# Patient Record
Sex: MALE | Race: AMERICAN INDIAN OR ALASKA NATIVE | NOT HISPANIC OR LATINO | Employment: FULL TIME | URBAN - METROPOLITAN AREA
[De-identification: names, ages, dates, MRNs, and addresses within clinical notes are randomized per-mention and may not be internally consistent; named-entity substitution may affect disease eponyms.]

---

## 2017-01-16 ENCOUNTER — OFFICE VISIT (OUTPATIENT)
Dept: INTERNAL MEDICINE | Facility: CLINIC | Age: 47
End: 2017-01-16
Payer: COMMERCIAL

## 2017-01-16 ENCOUNTER — LAB VISIT (OUTPATIENT)
Dept: LAB | Facility: HOSPITAL | Age: 47
End: 2017-01-16
Attending: FAMILY MEDICINE
Payer: COMMERCIAL

## 2017-01-16 VITALS
DIASTOLIC BLOOD PRESSURE: 78 MMHG | BODY MASS INDEX: 37.43 KG/M2 | SYSTOLIC BLOOD PRESSURE: 142 MMHG | HEART RATE: 78 BPM | TEMPERATURE: 97 F | WEIGHT: 246.94 LBS | HEIGHT: 68 IN | OXYGEN SATURATION: 99 %

## 2017-01-16 DIAGNOSIS — E66.9 OBESITY, CLASS II, BMI 35-39.9, NO COMORBIDITY: ICD-10-CM

## 2017-01-16 DIAGNOSIS — I10 ESSENTIAL HYPERTENSION: Primary | ICD-10-CM

## 2017-01-16 DIAGNOSIS — N52.9 ERECTILE DYSFUNCTION, UNSPECIFIED ERECTILE DYSFUNCTION TYPE: ICD-10-CM

## 2017-01-16 DIAGNOSIS — K21.9 GASTROESOPHAGEAL REFLUX DISEASE, ESOPHAGITIS PRESENCE NOT SPECIFIED: ICD-10-CM

## 2017-01-16 DIAGNOSIS — D64.9 ANEMIA, UNSPECIFIED TYPE: ICD-10-CM

## 2017-01-16 LAB
BASOPHILS # BLD AUTO: 0.02 K/UL
BASOPHILS NFR BLD: 0.4 %
DIFFERENTIAL METHOD: ABNORMAL
EOSINOPHIL # BLD AUTO: 0.1 K/UL
EOSINOPHIL NFR BLD: 1.3 %
ERYTHROCYTE [DISTWIDTH] IN BLOOD BY AUTOMATED COUNT: 16.1 %
HCT VFR BLD AUTO: 41.4 %
HGB BLD-MCNC: 13.3 G/DL
LYMPHOCYTES # BLD AUTO: 2 K/UL
LYMPHOCYTES NFR BLD: 41.9 %
MCH RBC QN AUTO: 28.9 PG
MCHC RBC AUTO-ENTMCNC: 32.1 %
MCV RBC AUTO: 90 FL
MONOCYTES # BLD AUTO: 0.5 K/UL
MONOCYTES NFR BLD: 10.3 %
NEUTROPHILS # BLD AUTO: 2.1 K/UL
NEUTROPHILS NFR BLD: 46.1 %
PLATELET # BLD AUTO: 241 K/UL
PMV BLD AUTO: 11.2 FL
RBC # BLD AUTO: 4.61 M/UL
TESTOST SERPL-MCNC: 417 NG/DL
WBC # BLD AUTO: 4.65 K/UL

## 2017-01-16 PROCEDURE — 85025 COMPLETE CBC W/AUTO DIFF WBC: CPT

## 2017-01-16 PROCEDURE — 1159F MED LIST DOCD IN RCRD: CPT | Mod: S$GLB,,, | Performed by: FAMILY MEDICINE

## 2017-01-16 PROCEDURE — 3077F SYST BP >= 140 MM HG: CPT | Mod: S$GLB,,, | Performed by: FAMILY MEDICINE

## 2017-01-16 PROCEDURE — 3078F DIAST BP <80 MM HG: CPT | Mod: S$GLB,,, | Performed by: FAMILY MEDICINE

## 2017-01-16 PROCEDURE — 99214 OFFICE O/P EST MOD 30 MIN: CPT | Mod: S$GLB,,, | Performed by: FAMILY MEDICINE

## 2017-01-16 PROCEDURE — 99999 PR PBB SHADOW E&M-EST. PATIENT-LVL III: CPT | Mod: PBBFAC,,, | Performed by: FAMILY MEDICINE

## 2017-01-16 PROCEDURE — 84403 ASSAY OF TOTAL TESTOSTERONE: CPT

## 2017-01-16 PROCEDURE — 36415 COLL VENOUS BLD VENIPUNCTURE: CPT | Mod: PO

## 2017-01-16 RX ORDER — LOSARTAN POTASSIUM AND HYDROCHLOROTHIAZIDE 12.5; 1 MG/1; MG/1
TABLET ORAL
Qty: 90 TABLET | Refills: 1 | Status: SHIPPED | OUTPATIENT
Start: 2017-01-16 | End: 2017-09-14 | Stop reason: SDUPTHER

## 2017-01-16 RX ORDER — TADALAFIL 10 MG/1
10 TABLET ORAL DAILY PRN
Qty: 10 TABLET | Refills: 0 | Status: SHIPPED | OUTPATIENT
Start: 2017-01-16 | End: 2017-05-29 | Stop reason: SDUPTHER

## 2017-01-16 NOTE — MR AVS SNAPSHOT
[facility]  [address] [facility] [place] [address]  Phone: [phone]  Fax: [phone]                  Guillaume Conde   2017 9:40 AM   Office Visit    Description:  Male : 1970   Provider:  Trisha Burger MD   Department:  [facility] - Internal Medicine           Reason for Visit     Follow-up           Diagnoses this Visit        Comments    Essential hypertension    -  Primary     Gastroesophageal reflux disease, esophagitis presence not specified         Obesity, Class II, BMI 35-39.9, no comorbidity         Erectile dysfunction, unspecified erectile dysfunction type         Low sexual desire disorder                To Do List           Future Appointments        Provider Department Dept Phone    2017 11:30 AM LAB, SAME DAY [facility] [phone]      Goals (5 Years of Data)     None      Follow-Up and Disposition     Return in about 6 months (around 2017), or if symptoms worsen or fail to improve.       These Medications        Disp Refills Start End    losartan-hydrochlorothiazide 100-12.5 mg (HYZAAR) 100-12.5 mg Tab 90 tablet 1 2017     TK 1 T PO D    Pharmacy: [facility] [address] [place] [address] [address] Ph #: [phone]       tadalafil (CIALIS) 10 MG tablet 10 tablet 0 2017    Take 1 tablet (10 mg total) by mouth daily as needed for Erectile Dysfunction. - Oral    Pharmacy: [facility] [address] [place] [id number] [facility] [address] Ph #: [phone]         [facility] On Call     [facility] On Call Nurse Care Line -  Assistance  Registered nurses in the [facility] On Call Center provide clinical advisement, health education, appointment booking, and other advisory services.  Call for this free service at [phone].             Medications           Message regarding Medications     Verify the changes and/or additions to your medication regime listed  "below are the same as discussed with your clinician today.  If any of these changes or additions are incorrect, please notify your healthcare provider.             Verify that the below list of medications is an accurate representation of the medications you are currently taking.  If none reported, the list may be blank. If incorrect, please contact your healthcare provider. Carry this list with you in case of emergency.           Current Medications     losartan-hydrochlorothiazide 100-12.5 mg (HYZAAR) 100-12.5 mg Tab TK 1 T PO D    esomeprazole (NEXIUM) 20 MG capsule Take 20 mg by mouth before breakfast.    tadalafil (CIALIS) 10 MG tablet Take 1 tablet (10 mg total) by mouth daily as needed for Erectile Dysfunction.           Clinical Reference Information           Vital Signs - Last Recorded  Most recent update: 1/16/2017  9:24 AM by Mallika Zabala MA    BP Pulse Temp Ht Wt SpO2    (!) 142/78 78 96.7 °F (35.9 °C) (Tympanic) 5' 8" (1.727 m) 112 kg (246 lb 14.6 oz) 99%    BMI                37.54 kg/m2          Blood Pressure          Most Recent Value    BP  (!)  142/78      Allergies as of 1/16/2017     No Known Allergies      Immunizations Administered on Date of Encounter - 1/16/2017     None      Orders Placed During Today's Visit     Future Labs/Procedures Expected by Expires    CBC auto differential  1/16/2017 3/17/2018    TESTOSTERONE  1/16/2017 3/17/2018      "

## 2017-01-16 NOTE — PROGRESS NOTES
"Subjective:       Patient ID: Guillaume Conde is a 46 y.o. male.    Chief Complaint: Follow-up    HPI Comments: 46-year-old -American male patient with Patient Active Problem List:     GERD (gastroesophageal reflux disease)     Essential hypertension  Here for follow-up on chronic medical conditions, reports that he is been taking Nexium only as needed, takes blood pressure medication regularly.  Patient has been having low sexual desire and requesting refill on Cialis.  Patient has not been previously checked on hormone levels  Has been exercising twice a week, denies of any chest pain or shortness of breath, headache, reports minimal fatigue        Review of Systems   Constitutional: Positive for fatigue. Negative for appetite change.   Eyes: Negative for visual disturbance.   Respiratory: Negative for shortness of breath.    Cardiovascular: Negative for chest pain and leg swelling.   Gastrointestinal: Negative for abdominal pain, nausea and vomiting.   Musculoskeletal: Negative for myalgias.   Skin: Negative for rash.   Neurological: Negative for headaches.   Psychiatric/Behavioral: Negative for sleep disturbance.         Visit Vitals    BP (!) 142/78    Pulse 78    Temp 96.7 °F (35.9 °C) (Tympanic)    Ht 5' 8" (1.727 m)    Wt 112 kg (246 lb 14.6 oz)    SpO2 99%    BMI 37.54 kg/m2     Objective:      Physical Exam   Constitutional: He is oriented to person, place, and time. He appears well-developed and well-nourished.   HENT:   Head: Normocephalic and atraumatic.   Mouth/Throat: Oropharynx is clear and moist.   Cardiovascular: Normal rate, regular rhythm and normal heart sounds.    No murmur heard.  Pulmonary/Chest: Effort normal and breath sounds normal. He has no wheezes.   Abdominal: Soft. Bowel sounds are normal. There is no tenderness.   Musculoskeletal: He exhibits no edema.   Neurological: He is alert and oriented to person, place, and time.   Skin: Skin is warm and dry. No rash noted. "   Psychiatric: He has a normal mood and affect.         Assessment:       1. Essential hypertension    2. Gastroesophageal reflux disease, esophagitis presence not specified    3. Obesity, Class II, BMI 35-39.9, no comorbidity    4. Erectile dysfunction, unspecified erectile dysfunction type    5. Low sexual desire disorder    6. Anemia, unspecified type        Plan:   Essential hypertension  -     losartan-hydrochlorothiazide 100-12.5 mg (HYZAAR) 100-12.5 mg Tab; TK 1 T PO D  Dispense: 90 tablet; Refill: 1  Blood pressure stable today, currently taking losartan hydrochlorothiazide 100/12.5 mg daily, refill given today  Restrict salt intake and eat low-fat and low-cholesterol diet and exercise 30 minutes daily    Gastroesophageal reflux disease, esophagitis presence not specified-stable on Nexium as needed    Obesity, Class II, BMI 35-39.9, no comorbidity-encouraged to work on lifestyle modifications with diet and exercise to lose weight with BMI 37.5    Erectile dysfunction, unspecified erectile dysfunction type  -     CBC auto differential; Future; Expected date: 1/16/17  -     TESTOSTERONE; Future; Expected date: 1/16/17  -     tadalafil (CIALIS) 10 MG tablet; Take 1 tablet (10 mg total) by mouth daily as needed for Erectile Dysfunction.  Dispense: 10 tablet; Refill: 0    Low sexual desire disorder  -     tadalafil (CIALIS) 10 MG tablet; Take 1 tablet (10 mg total) by mouth daily as needed for Erectile Dysfunction.  Dispense: 10 tablet; Refill: 0  Refill given on Cialis, advised to use it sparingly, will check testosterone levels, if low than normal will consider referral to urology.   Previous labs showed mild anemia, will recheck CBC counts today.  Encouraged to eat protein rich diet

## 2017-05-29 DIAGNOSIS — N52.9 ERECTILE DYSFUNCTION, UNSPECIFIED ERECTILE DYSFUNCTION TYPE: ICD-10-CM

## 2017-05-29 RX ORDER — TADALAFIL 10 MG/1
10 TABLET ORAL DAILY PRN
Qty: 10 TABLET | Refills: 0 | Status: SHIPPED | OUTPATIENT
Start: 2017-05-29 | End: 2017-07-10 | Stop reason: SDUPTHER

## 2017-07-10 DIAGNOSIS — N52.9 ERECTILE DYSFUNCTION, UNSPECIFIED ERECTILE DYSFUNCTION TYPE: ICD-10-CM

## 2017-07-10 RX ORDER — TADALAFIL 10 MG/1
10 TABLET ORAL DAILY PRN
Qty: 10 TABLET | Refills: 0 | Status: SHIPPED | OUTPATIENT
Start: 2017-07-10 | End: 2017-11-21 | Stop reason: ALTCHOICE

## 2017-09-14 DIAGNOSIS — I10 ESSENTIAL HYPERTENSION: ICD-10-CM

## 2017-09-15 RX ORDER — LOSARTAN POTASSIUM AND HYDROCHLOROTHIAZIDE 12.5; 1 MG/1; MG/1
TABLET ORAL
Qty: 90 TABLET | Refills: 0 | Status: SHIPPED | OUTPATIENT
Start: 2017-09-15 | End: 2018-02-08 | Stop reason: SDUPTHER

## 2017-11-21 ENCOUNTER — OFFICE VISIT (OUTPATIENT)
Dept: INTERNAL MEDICINE | Facility: CLINIC | Age: 47
End: 2017-11-21
Payer: COMMERCIAL

## 2017-11-21 VITALS
SYSTOLIC BLOOD PRESSURE: 116 MMHG | DIASTOLIC BLOOD PRESSURE: 82 MMHG | TEMPERATURE: 97 F | HEART RATE: 89 BPM | WEIGHT: 227.5 LBS | OXYGEN SATURATION: 98 % | BODY MASS INDEX: 34.48 KG/M2 | HEIGHT: 68 IN

## 2017-11-21 DIAGNOSIS — N52.9 ERECTILE DYSFUNCTION, UNSPECIFIED ERECTILE DYSFUNCTION TYPE: ICD-10-CM

## 2017-11-21 DIAGNOSIS — I10 ESSENTIAL HYPERTENSION: ICD-10-CM

## 2017-11-21 DIAGNOSIS — K21.9 GASTROESOPHAGEAL REFLUX DISEASE, ESOPHAGITIS PRESENCE NOT SPECIFIED: Primary | ICD-10-CM

## 2017-11-21 PROCEDURE — 99213 OFFICE O/P EST LOW 20 MIN: CPT | Mod: S$GLB,,, | Performed by: PHYSICIAN ASSISTANT

## 2017-11-21 PROCEDURE — 99999 PR PBB SHADOW E&M-EST. PATIENT-LVL III: CPT | Mod: PBBFAC,,, | Performed by: PHYSICIAN ASSISTANT

## 2017-11-21 RX ORDER — DIETHYLPROPION HYDROCHLORIDE 25 MG/1
1 TABLET ORAL 3 TIMES DAILY
Refills: 0 | COMMUNITY
Start: 2017-11-17 | End: 2018-08-02

## 2017-11-21 RX ORDER — PANTOPRAZOLE SODIUM 40 MG/1
40 TABLET, DELAYED RELEASE ORAL DAILY
Qty: 90 TABLET | Refills: 1 | Status: SHIPPED | OUTPATIENT
Start: 2017-11-21 | End: 2018-07-02 | Stop reason: SDUPTHER

## 2017-11-21 RX ORDER — SILDENAFIL 50 MG/1
50 TABLET, FILM COATED ORAL DAILY PRN
Qty: 15 TABLET | Refills: 0 | Status: SHIPPED | OUTPATIENT
Start: 2017-11-21 | End: 2018-07-02 | Stop reason: SDUPTHER

## 2017-11-21 NOTE — PROGRESS NOTES
Subjective:      Patient ID: Guillaume Conde is a 47 y.o. male.    Chief Complaint: Dysphagia    HPI  Reports that for about 1 week he has had some discomfort while swallowing liquids and solids. Has a history of acid reflux but stopped the Nexium because lost weight and hasn't been having any issues with acid reflux. Reports that he noticed it first when he was eating Gumbo. Since then with every meal he feels discomfort while swallowing. Denies actually choking on foods. Denies chest pain, shortness of breath, wheezing, or stridor.     Also requesting his cialis be changed to something that might be covered by his insurance. Patient states that since he started his blood pressure medication he has been having trouble achieving and maintaining an erection during intercourse. Symptoms resolved with cialis however expensive and require PA every time.     Review of Systems   Constitutional: Negative for activity change, appetite change, chills, diaphoresis, fatigue, fever and unexpected weight change.   HENT: Positive for trouble swallowing. Negative for congestion, hearing loss, postnasal drip, rhinorrhea, sore throat and voice change.    Eyes: Negative.  Negative for visual disturbance.   Respiratory: Negative.  Negative for cough, choking, chest tightness and shortness of breath.    Cardiovascular: Negative for chest pain, palpitations and leg swelling.   Gastrointestinal: Negative for abdominal distention, abdominal pain, blood in stool, constipation, diarrhea, nausea and vomiting.   Endocrine: Negative for cold intolerance, heat intolerance, polydipsia and polyuria.   Genitourinary: Negative.  Negative for difficulty urinating, discharge, frequency, penile pain, penile swelling, scrotal swelling and testicular pain.   Musculoskeletal: Negative for arthralgias, back pain, gait problem, joint swelling and myalgias.   Skin: Negative for color change, pallor, rash and wound.   Neurological: Negative for dizziness,  "tremors, weakness, light-headedness, numbness and headaches.   Hematological: Negative for adenopathy.   Psychiatric/Behavioral: Negative for behavioral problems, confusion, self-injury, sleep disturbance and suicidal ideas. The patient is not nervous/anxious.      Objective:   /82   Pulse 89   Temp 97.1 °F (36.2 °C) (Tympanic)   Ht 5' 8" (1.727 m)   Wt 103.2 kg (227 lb 8.2 oz)   SpO2 98%   BMI 34.59 kg/m²     Physical Exam   Constitutional: He is oriented to person, place, and time. He appears well-developed and well-nourished.   HENT:   Head: Normocephalic and atraumatic.   Right Ear: Tympanic membrane, external ear and ear canal normal.   Left Ear: Tympanic membrane, external ear and ear canal normal.   Nose: Nose normal. No mucosal edema or rhinorrhea.   Mouth/Throat: Uvula is midline and mucous membranes are normal. No trismus in the jaw. Normal dentition. No uvula swelling. Posterior oropharyngeal erythema present. No oropharyngeal exudate, posterior oropharyngeal edema or tonsillar abscesses. No tonsillar exudate.   Eyes: Conjunctivae and EOM are normal. Pupils are equal, round, and reactive to light.   Neck: Trachea normal, normal range of motion and full passive range of motion without pain. Neck supple. No muscular tenderness present. No neck rigidity. No edema, no erythema and normal range of motion present. No thyroid mass and no thyromegaly present.   Cardiovascular: Normal rate, regular rhythm and normal heart sounds.  Exam reveals no gallop and no friction rub.    No murmur heard.  Pulmonary/Chest: Effort normal and breath sounds normal. No respiratory distress. He has no wheezes. He has no rales. He exhibits no tenderness.   Abdominal: Soft. He exhibits no distension. There is no tenderness.   Musculoskeletal: Normal range of motion.   Lymphadenopathy:     He has no cervical adenopathy.   Neurological: He is alert and oriented to person, place, and time.   Skin: Skin is warm and dry. "   Psychiatric: He has a normal mood and affect. His behavior is normal. Judgment and thought content normal.   Vitals reviewed.      Assessment:     1. Gastroesophageal reflux disease, esophagitis presence not specified    2. Erectile dysfunction, unspecified erectile dysfunction type    3. Essential hypertension      Plan:   Gastroesophageal reflux disease, esophagitis presence not specified  -     pantoprazole (PROTONIX) 40 MG tablet; Take 1 tablet (40 mg total) by mouth once daily.  Dispense: 90 tablet; Refill: 1  -if no improvement with treatment, will refer to GI.   -Educational handout on GERD prevention tips was handed to the patient and discussed in detail.    Erectile dysfunction, unspecified erectile dysfunction type  -     sildenafil (VIAGRA) 50 MG tablet; Take 1 tablet (50 mg total) by mouth daily as needed for Erectile Dysfunction.  Dispense: 15 tablet; Refill: 0  -do not exceed more than 100mg daily.     Essential HTN  -stable on current medications.     Return if symptoms worsen or fail to improve.

## 2017-11-21 NOTE — PATIENT INSTRUCTIONS
Tips to Control Acid Reflux    To control acid reflux, youll need to make some basic diet and lifestyle changes. The simple steps outlined below may be all youll need to ease discomfort.  Watch what you eat  · Avoid fatty foods and spicy foods.  · Eat fewer acidic foods, such as citrus and tomato-based foods. These can increase symptoms.  · Limit drinking alcohol, caffeine, and fizzy beverages. All increase acid reflux.  · Try limiting chocolate, peppermint, and spearmint. These can worsen acid reflux in some people.  Watch when you eat  · Avoid lying down for 3 hours after eating.  · Do not snack before going to bed.  Raise your head  Raising your head and upper body by 4 to 6 inches helps limit reflux when youre lying down. Put blocks under the head of your bed frame to raise it.  Other changes  · Lose weight, if you need to  · Dont exercise near bedtime  · Avoid tight-fitting clothes  · Limit aspirin and ibuprofen  · Stop smoking   Date Last Reviewed: 7/1/2016  © 0660-7633 [employer] [address] [place] [address], [place] [address]. All rights reserved. This information is not intended as a substitute for professional medical care. Always follow your healthcare professional's instructions.

## 2018-02-08 DIAGNOSIS — I10 ESSENTIAL HYPERTENSION: ICD-10-CM

## 2018-02-08 RX ORDER — LOSARTAN POTASSIUM AND HYDROCHLOROTHIAZIDE 12.5; 1 MG/1; MG/1
TABLET ORAL
Qty: 30 TABLET | Refills: 0 | Status: SHIPPED | OUTPATIENT
Start: 2018-02-08 | End: 2018-03-20 | Stop reason: SDUPTHER

## 2018-03-20 DIAGNOSIS — I10 ESSENTIAL HYPERTENSION: ICD-10-CM

## 2018-03-20 RX ORDER — LOSARTAN POTASSIUM AND HYDROCHLOROTHIAZIDE 12.5; 1 MG/1; MG/1
TABLET ORAL
Qty: 30 TABLET | Refills: 0 | Status: SHIPPED | OUTPATIENT
Start: 2018-03-20 | End: 2018-04-28 | Stop reason: SDUPTHER

## 2018-04-28 DIAGNOSIS — I10 ESSENTIAL HYPERTENSION: ICD-10-CM

## 2018-04-28 RX ORDER — LOSARTAN POTASSIUM AND HYDROCHLOROTHIAZIDE 12.5; 1 MG/1; MG/1
TABLET ORAL
Qty: 30 TABLET | Refills: 0 | Status: SHIPPED | OUTPATIENT
Start: 2018-04-28 | End: 2018-06-03 | Stop reason: SDUPTHER

## 2018-06-03 DIAGNOSIS — I10 ESSENTIAL HYPERTENSION: ICD-10-CM

## 2018-06-03 RX ORDER — LOSARTAN POTASSIUM AND HYDROCHLOROTHIAZIDE 12.5; 1 MG/1; MG/1
TABLET ORAL
Qty: 30 TABLET | Refills: 0 | Status: SHIPPED | OUTPATIENT
Start: 2018-06-03 | End: 2018-07-02 | Stop reason: SDUPTHER

## 2018-07-02 ENCOUNTER — OFFICE VISIT (OUTPATIENT)
Dept: INTERNAL MEDICINE | Facility: CLINIC | Age: 48
End: 2018-07-02
Payer: COMMERCIAL

## 2018-07-02 VITALS
DIASTOLIC BLOOD PRESSURE: 84 MMHG | OXYGEN SATURATION: 99 % | HEART RATE: 67 BPM | TEMPERATURE: 97 F | BODY MASS INDEX: 35.68 KG/M2 | WEIGHT: 235.44 LBS | HEIGHT: 68 IN | SYSTOLIC BLOOD PRESSURE: 126 MMHG

## 2018-07-02 DIAGNOSIS — K21.9 GASTROESOPHAGEAL REFLUX DISEASE, ESOPHAGITIS PRESENCE NOT SPECIFIED: ICD-10-CM

## 2018-07-02 DIAGNOSIS — M54.32 SCIATICA OF LEFT SIDE: Primary | ICD-10-CM

## 2018-07-02 DIAGNOSIS — M54.2 CERVICALGIA: ICD-10-CM

## 2018-07-02 DIAGNOSIS — N52.9 ERECTILE DYSFUNCTION, UNSPECIFIED ERECTILE DYSFUNCTION TYPE: ICD-10-CM

## 2018-07-02 DIAGNOSIS — I10 ESSENTIAL HYPERTENSION: ICD-10-CM

## 2018-07-02 PROCEDURE — 99999 PR PBB SHADOW E&M-EST. PATIENT-LVL IV: CPT | Mod: PBBFAC,,, | Performed by: PHYSICIAN ASSISTANT

## 2018-07-02 PROCEDURE — 99214 OFFICE O/P EST MOD 30 MIN: CPT | Mod: S$GLB,,, | Performed by: PHYSICIAN ASSISTANT

## 2018-07-02 RX ORDER — PANTOPRAZOLE SODIUM 40 MG/1
40 TABLET, DELAYED RELEASE ORAL DAILY
Qty: 90 TABLET | Refills: 1 | Status: SHIPPED | OUTPATIENT
Start: 2018-07-02 | End: 2019-01-31 | Stop reason: SDUPTHER

## 2018-07-02 RX ORDER — SILDENAFIL 50 MG/1
50 TABLET, FILM COATED ORAL DAILY PRN
Qty: 15 TABLET | Refills: 0 | Status: SHIPPED | OUTPATIENT
Start: 2018-07-02 | End: 2019-10-24

## 2018-07-02 RX ORDER — LOSARTAN POTASSIUM AND HYDROCHLOROTHIAZIDE 12.5; 1 MG/1; MG/1
1 TABLET ORAL DAILY
Qty: 90 TABLET | Refills: 1 | Status: SHIPPED | OUTPATIENT
Start: 2018-07-02 | End: 2019-01-31 | Stop reason: SDUPTHER

## 2018-07-02 NOTE — PATIENT INSTRUCTIONS
Back Care Tips    Caring for your back  These are things you can do to prevent a recurrence of acute back pain and to reduce symptoms from chronic back pain:  · Maintain a healthy weight. If you are overweight, losing weight will help most types of back pain.  · Exercise is an important part of recovery from most types of back pain. The muscles behind and in front of the spine support the back. This means strengthening both the back muscles and the abdominal muscles will provide better support for your spine.   · Swimming and brisk walking are good overall exercises to improve your fitness level.  · Practice safe lifting methods (below).  · Practice good posture when sitting, standing and walking. Avoid prolonged sitting. This puts more stress on the lower back than standing or walking.  · Wear quality shoes with sufficient arch support. Foot and ankle alignment can affect back symptoms. Women should avoid wearing high heels.  · Therapeutic massage can help relax the back muscles without stretching them.  · During the first 24 to 72 hours after an acute injury or flare-up of chronic back pain, apply an ice pack to the painful area for 20 minutes and then remove it for 20 minutes, over a period of 60 to 90 minutes, or several times a day. As a safety precaution, do not use a heating pad at bedtime. Sleeping on a heating pad can lead to skin burns or tissue damage.  · You can alternate ice and heat therapies.  Medications  Talk to your healthcare provider before using medicines, especially if you have other medical problems or are taking other medicines.  · You may use acetaminophen or ibuprofen to control pain, unless your healthcare provider prescribed other pain medicine. If you have chronic conditions like diabetes, liver or kidney disease, stomach ulcers, or gastrointestinal bleeding, or are taking blood thinners, talk with your healthcare provider before taking any medicines.  · Be careful if you are given  prescription pain medicines, narcotics, or medicine for muscle spasm. They can cause drowsiness, affect your coordination, reflexes, and judgment. Do not drive or operate heavy machinery while taking these types of medicines. Take prescription pain medicine only as prescribed by your healthcare provider.  Lumbar stretch  Here is a simple stretching exercise that will help relax muscle spasm and keep your back more limber. If exercise makes your back pain worse, dont do it.  · Lie on your back with your knees bent and both feet on the ground.  · Slowly raise your left knee to your chest as you flatten your lower back against the floor. Hold for 5 seconds.  · Relax and repeat the exercise with your right knee.  · Do 10 of these exercises for each leg.  Safe lifting method  · Dont bend over at the waist to lift an object off the floor.  Instead, bend your knees and hips in a squat.   · Keep your back and head upright  · Hold the object close to your body, directly in front of you.  · Straighten your legs to lift the object.   · Lower the object to the floor in the reverse fashion.  · If you must slide something across the floor, push it.  Posture tips  Sitting  Sit in chairs with straight backs or low-back support. Keep your knees lower than your hips, with your feet flat on the floor.  When driving, sit up straight. Adjust the seat forward so you are not leaning toward the steering wheel.  A small pillow or rolled towel behind your lower back may help if you are driving long distances.   Standing  When standing for long periods, shift most of your weight to one leg at a time. Alternate legs every few minutes.   Sleeping  The best way to sleep is on your side with your knees bent. Put a low pillow under your head to support your neck in a neutral spine position. Avoid thick pillows that bend your neck to one side. Put a pillow between your legs to further relax your lower back. If you sleep on your back, put pillows  under your knees to support your legs in a slightly flexed position. Use a firm mattress. If your mattress sags, replace it, or use a 1/2-inch plywood board under the mattress to add support.  Follow-up care  Follow up with your healthcare provider, or as advised.  If X-rays, a CT scan or an MRI scan were taken, they will be reviewed by a radiologist. You will be notified of any new findings that may affect your care.  Call 911  Seek emergency medical care if any of the following occur:  · Trouble breathing  · Confusion  · Very drowsy  · Fainting or loss of consciousness  · Rapid or very slow heart rate  · Loss of  bowel or bladder control  When to seek medical care  Call your healthcare provider if any of the following occur:  · Pain becomes worse or spreads to your arms or legs  · Weakness or numbness in one or both arms or legs  · Numbness in the groin area  Date Last Reviewed: 6/1/2016  © 4770-5696 [employer]. [address] [place] [address]. All rights reserved. This information is not intended as a substitute for professional medical care. Always follow your healthcare professional's instructions.        General Neck and Back Pain    Both neck and back pain are usually caused by injury to the muscles or ligaments of the spine. Sometimes the disks that separate each bone of the spine may cause pain by pressing on a nearby nerve. Back and neck pain may appear after a sudden twisting or bending force (such as in a car accident), or sometimes after a simple awkward movement. In either case, muscle spasm is often present and adds to the pain.  Acute neck and back pain usually gets better in 1 to 2 weeks. Pain related to disk disease, arthritis in the spinal joints or spinal stenosis (narrowing of the spinal canal) can become chronic and last for months or years.  Back and neck pain are common problems. Most people feel better in 1 or 2 weeks, and most of the rest in 1 to 2 months. Most  people can remain active.  People experience and describe pain differently.  · Pain can be sharp, stabbing, shooting, aching, cramping, or burning  · Movement, standing, bending, lifting, sitting, or walking may worsen the pain  · Pain can be localized to one spot or area, or it can be more generalized  · Pain can spread or radiate upwards, downwards, to the front, or go down your arms  · Muscle spasm may occur.  Most of the time mechanical problems with the muscles or spine cause the pain. it is usually caused by an injury, whether known or not, to the muscles or ligaments. While illnesses can cause back pain, it is usually not caused by a serious illness. Pain is usually related to physical activity, whether sports, exercise, work, or normal activity. Sometimes it can occur without an identifiable cause. This can happen simply by stretching or moving wrong, without noting pain at the time. Other causes include:  · Overexertion, lifting, pushing, pulling incorrectly or too aggressively.  · Sudden twisting, bending or stretching from an accident (car or fall), or accidental movement.  · Poor posture  · Poor conditioning, lack of regular exercise  · Spinal disc disease or arthritis  · Stress  · Pregnancy, or illness like appendicitis, bladder or kidney infection, pelvic infections   Home care  · For neck pain: Use a comfortable pillow that supports the head and keeps the spine in a neutral position. The position of the head should not be tilted forward or backward.  · When in bed, try to find a position of comfort. A firm mattress is best. Try lying flat on your back with pillows under your knees. You can also try lying on your side with your knees bent up towards your chest and a pillow between your knees.  · At first, do not try to stretch out the sore spots. If there is a strain, it is not like the good soreness you get after exercising without an injury. In this case, stretching may make it worse.  · Avoid  prolonged sitting, long car rides or travel. This puts more stress on the lower back than standing or walking.  · During the first 24 to 72 hours after an injury, apply an ice pack to the painful area for 20 minutes and then remove it for 20 minutes over a period of 60 to 90 minutes or several times a day.   · You can alternate ice and heat therapies. Talk with your healthcare provider about the best treatment for your back or neck pain. As a safety precaution, do not use a heating pad at bedtime. Sleeping with a heating pad can lead to skin burns or tissue damage.  · Therapeutic massage can help relax the back and neck muscles without stretching them.  · Be aware of safe lifting methods and do not lift anything over 15 pounds until all the pain is gone.  Medications  Talk to your healthcare provider before using medicine, especially if you have other medical problems or are taking other medicines.  · You may use over-the-counter medicine to control pain, unless another pain medicine was prescribed. If you have chronic conditions like diabetes, liver or kidney disease, stomach ulcers,  gastrointestinal bleeding, or are taking blood thinner medicines.  · Be careful if you are given pain medicines, narcotics, or medicine for muscle spasm. They can cause drowsiness, and can affect your coordination, reflexes, and judgment. Do not drive or operate heavy machinery.  Follow-up care  Follow up with your healthcare provider, or as advised. Physical therapy or further tests may be needed.  If X-rays were taken, you will be notified of any new findings that may affect your care.  Call 911  Seek emergency medical care if any of the following occur:  · Trouble breathing  · Confusion  · Very drowsy or trouble awakening  · Fainting or loss of consciousness  · Rapid or very slow heart rate  · Loss of bowel or bladder control  When to seek medical advice  Call your healthcare provider right away if any of these occur:  · Pain  becomes worse or spreads into your arms or legs  · Weakness, numbness or pain in one or both arms or legs  · Numbness in the groin area  · Difficulty walking  · Fever of 100.4ºF (38ºC) or higher, or as directed by your healthcare provider  Date Last Reviewed: 7/1/2016  © 4477-2068 [employer]. [address] [place] [address], [place] [address]. All rights reserved. This information is not intended as a substitute for professional medical care. Always follow your healthcare professional's instructions.        Caring for Your Back Throughout the Day  Take care of your back throughout the day. You will likely have fewer back problems if you do. Try to warm up before you move. Shift positions often. Also do your best to form healthy habits.    Warm up for the day  Do a few slow, catlike stretches before starting your day. This simple warmup can soften your disks, stretch your back muscles, and help prevent injuries.  Shift positions often  At work and at home, change positions often. This helps keep your body from getting stiff. Stand up or lean back while you sit. If you can, get up and move every 1/2 hour.  Form healthy habits  Here are some suggestions:   · Keep a healthy weight. When you weigh too much, your back is under excess strain. But losing just a few extra pounds can help a lot.  · Try not to overeat. Learn about serving sizes. The size of a serving depends on the food and the food group. Many foods list serving sizes on the labels.  · Handle minor aches with cold and heat. Apply cold the first 24 to 48 hours. Use heat after that. Always place a thin cloth between your skin and the source of cold or heat.  · Take medicines as directed. This helps keep pain under control. Always read labels, and call your healthcare provider or pharmacist if you have any questions.  Walk each day  A daily walk keeps your back and thigh muscles stretched and strong. This gives your back better support. Be sure to walk  with your spines three curves aligned, by keeping your head, hips, and toes connected by a vertical line.   Date Last Reviewed: 10/18/2015  © 3913-3782 [employer]. [address] [place] [address]. All rights reserved. This information is not intended as a substitute for professional medical care. Always follow your healthcare professional's instructions.        Causes of Lumbar (Low Back) Pain  Low back pain can be caused by problems with any part of the lumbar spine. A disk can herniate (push out) and press on a nerve. Vertebrae can rub against each other or slip out of place. This can irritate facet joints and nerves. It can also lead to stenosis, a narrowing of the spinal canal or foramen.  Pressure from a disk  Constant wear and tear on a disk can cause it to weaken and push outward. Part of the disk may then press on nearby nerves. There are two common types of herniated disks:  Contained means the soft nucleus is protruding outward.   Extruded means the firm annulus has torn, letting the soft center squeeze through.     Pressure from bone  An unstable spine   With age, a disk may thin and wear out. Vertebrae above and below the disk may begin to touch. This can put pressure on nerves. It can also cause bone spurs (growths) to form where the bones rub together.    Stenosis results when bone spurs narrow the foramen or spinal canal. This also puts pressure on nerves. Slipping vertebrae can irritate nerves and joints. They can also worsen stenosis.    In some cases, vertebrae become unstable and slip forward. This is called spondylolisthesis.     Date Last Reviewed: 10/12/2015  © 5270-9610 [employer]. [address] [place] [address]. All rights reserved. This information is not intended as a substitute for professional medical care. Always follow your healthcare professional's instructions.        Sciatica    Sciatica is a condition that causes pain in the lower  back that spreads down into the buttock, hip, and leg. Sometimes the leg pain can happen without any back pain. Sciatica happens when a spinal nerve is irritated or has pressure put on it as comes out of the spinal canal in the lower back. This most often happens when a bulge or rupture of a nearby spinal disk presses on the nerve. Sciatica can also be caused by a narrowing of the spinal canal (spinal stenosis) or spasm of the muscle in the buttocks that the sciatic nerve passes through (pyriform muscle). Sciatica is also called lumbar radiculopathy.  Sciatica may begin after a sudden twisting or bending force, such as in a car accident. Or it can happen after a simple awkward movement. In either case, muscle spasm often also happens. Muscle spasm makes the pain worse.  A healthcare provider makes a diagnosis of sciatica from your symptoms and a physical exam. Unless you had an injury from a car accident or fall, you usually wont have X-rays taken at this time. This is because the nerves and disks in your back cant be seen on an X-ray. If the provider sees signs of a compressed nerve, you will need to schedule an MRI scan as an outpatient. Signs of a compressed nerve include loss of strength in a leg.  Most sciatica gets better with medicine, exercise, and physical therapy. If your symptoms continue after at least 3 months of medical treatment, you may need surgery or injections to your lower back.  Home care  Follow these tips when caring for yourself at home:  · You may need to stay in bed the first few days. But as soon as possible, begin sitting up or walking. This will help you avoid problems that come from staying in bed for long periods.  · When in bed, try to find a position that is comfortable. A firm mattress is best. Try lying flat on your back with pillows under your knees. You can also try lying on your side with your knees bent up toward your chest and a pillow between your knees.  · Avoid sitting  for long periods. This puts more stress on your lower back than standing or walking.  · Use heat from a hot shower, hot bath, or heating pad to help ease pain. Massage can also help. You can also try using an ice pack. You can make your own ice pack by putting ice cubes in a plastic bag. Wrap the bag in a thin towel. Try both heat and cold to see which works best. Use the method that feels best for 20 minutes several times a day.  · You may use acetaminophen or ibuprofen to ease pain, unless another pain medicine was prescribed. Note: If you have chronic liver or kidney disease, talk with your healthcare provider before taking these medicines. Also talk with your provider if youve had a stomach ulcer or gastrointestinal bleeding.  · Use safe lifting methods. Dont lift anything heavier than 15 pounds until all of the pain is gone.  Follow-up care  Follow up with your healthcare provider, or as advised. You may need physical therapy or additional tests.  If X-rays were taken, a radiologist will look at them. You will be told of any new findings that may affect your care.  When to seek medical advice  Call your healthcare provider right away if any of these occur:  · Pain gets worse even after taking prescribed medicine  · Weakness or numbness in 1 or both legs or hips  · Numbness in your groin or genital area  · You cant control your bowel or bladder  · Fever  · Redness or swelling over your back or spine   Date Last Reviewed: 8/1/2016  © 8575-5352 [employer] [address] [place] [address], [place] [address]. All rights reserved. This information is not intended as a substitute for professional medical care. Always follow your healthcare professional's instructions.
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
## 2018-08-02 ENCOUNTER — OFFICE VISIT (OUTPATIENT)
Dept: INTERNAL MEDICINE | Facility: CLINIC | Age: 48
End: 2018-08-02
Payer: COMMERCIAL

## 2018-08-02 ENCOUNTER — LAB VISIT (OUTPATIENT)
Dept: LAB | Facility: HOSPITAL | Age: 48
End: 2018-08-02
Attending: PHYSICIAN ASSISTANT
Payer: COMMERCIAL

## 2018-08-02 VITALS
HEIGHT: 68 IN | OXYGEN SATURATION: 99 % | BODY MASS INDEX: 35.79 KG/M2 | SYSTOLIC BLOOD PRESSURE: 114 MMHG | HEART RATE: 94 BPM | WEIGHT: 236.13 LBS | DIASTOLIC BLOOD PRESSURE: 82 MMHG | TEMPERATURE: 98 F

## 2018-08-02 DIAGNOSIS — N50.89 TESTICULAR SWELLING: Primary | ICD-10-CM

## 2018-08-02 DIAGNOSIS — R34 OLIGURIA: ICD-10-CM

## 2018-08-02 DIAGNOSIS — R30.0 DYSURIA: ICD-10-CM

## 2018-08-02 DIAGNOSIS — R30.0 DYSURIA: Primary | ICD-10-CM

## 2018-08-02 DIAGNOSIS — N52.9 ERECTILE DYSFUNCTION, UNSPECIFIED ERECTILE DYSFUNCTION TYPE: ICD-10-CM

## 2018-08-02 LAB
BILIRUB UR QL STRIP: NEGATIVE
CLARITY UR: CLEAR
COLOR UR: YELLOW
GLUCOSE UR QL STRIP: NEGATIVE
HGB UR QL STRIP: NEGATIVE
KETONES UR QL STRIP: NEGATIVE
LEUKOCYTE ESTERASE UR QL STRIP: NEGATIVE
NITRITE UR QL STRIP: NEGATIVE
PH UR STRIP: 6 [PH] (ref 5–8)
PROT UR QL STRIP: NEGATIVE
SP GR UR STRIP: 1.01 (ref 1–1.03)
URN SPEC COLLECT METH UR: NORMAL

## 2018-08-02 PROCEDURE — 87086 URINE CULTURE/COLONY COUNT: CPT

## 2018-08-02 PROCEDURE — 81003 URINALYSIS AUTO W/O SCOPE: CPT | Mod: PO

## 2018-08-02 PROCEDURE — 87491 CHLMYD TRACH DNA AMP PROBE: CPT

## 2018-08-02 PROCEDURE — 99999 PR PBB SHADOW E&M-EST. PATIENT-LVL IV: CPT | Mod: PBBFAC,,, | Performed by: PHYSICIAN ASSISTANT

## 2018-08-02 PROCEDURE — 99213 OFFICE O/P EST LOW 20 MIN: CPT | Mod: S$GLB,,, | Performed by: PHYSICIAN ASSISTANT

## 2018-08-02 RX ORDER — FENOPROFEN CALCIUM 400 MG/1
CAPSULE ORAL
COMMUNITY
Start: 2018-07-24 | End: 2018-09-12

## 2018-08-02 RX ORDER — PHENTERMINE HYDROCHLORIDE 37.5 MG/1
TABLET ORAL
Refills: 0 | COMMUNITY
Start: 2018-07-30 | End: 2018-09-12

## 2018-08-02 NOTE — PROGRESS NOTES
Subjective:      Patient ID: Guillaume Conde is a 48 y.o. male.    Chief Complaint: Urinary Tract Infection    Feels like with urination he has a slowed stream despite having the urge to void. States that it improves a little when he maneuvers his testicles.   Also reports that his testicles feel heavy. No pain, just feel heavy.   Also reports issues with erection and ejaculation despite using the viagra.   Has not seen a urologist.       Urinary Frequency    Associated symptoms include frequency. Pertinent negatives include no chills, flank pain, hematuria, hesitancy, nausea, urgency, vomiting, constipation or rash.   Testicle Pain   The patient's primary symptoms include scrotal swelling (feels heavy, mild swelling bilateral and symmetric). The patient's pertinent negatives include no genital injury, genital itching, genital lesions, pelvic pain, penile discharge, penile pain or testicular pain. This is a new problem. The current episode started in the past 7 days. The problem occurs constantly. The problem has been unchanged. The patient is experiencing no pain (more of a discomfort or feeling heavy). Associated symptoms include frequency and urinary retention. Pertinent negatives include no abdominal pain, chest pain, chills, constipation, coughing, diarrhea, dysuria, fever, flank pain, headaches, hematuria, hesitancy, joint pain, joint swelling, nausea, painful intercourse, rash, shortness of breath, sore throat, urgency or vomiting.       Review of Systems   Constitutional: Negative for activity change, appetite change, chills, diaphoresis, fatigue, fever and unexpected weight change.   HENT: Negative.  Negative for congestion, hearing loss, postnasal drip, rhinorrhea, sore throat, trouble swallowing and voice change.    Eyes: Negative.  Negative for visual disturbance.   Respiratory: Negative.  Negative for cough, choking, chest tightness and shortness of breath.    Cardiovascular: Negative for chest pain,  "palpitations and leg swelling.   Gastrointestinal: Negative for abdominal distention, abdominal pain, anal bleeding, blood in stool, constipation, diarrhea, nausea, rectal pain and vomiting.   Endocrine: Negative for cold intolerance, heat intolerance, polydipsia and polyuria.   Genitourinary: Positive for decreased urine volume, difficulty urinating, frequency and scrotal swelling (feels heavy, mild swelling bilateral and symmetric). Negative for discharge, dysuria, enuresis, flank pain, genital sores, hematuria, hesitancy, pelvic pain, penile pain, penile swelling, testicular pain and urgency.   Musculoskeletal: Negative for arthralgias, back pain, gait problem, joint pain, joint swelling and myalgias.   Skin: Negative for color change, pallor, rash and wound.   Neurological: Negative for dizziness, tremors, weakness, light-headedness, numbness and headaches.   Hematological: Negative for adenopathy.   Psychiatric/Behavioral: Negative for behavioral problems, confusion, self-injury, sleep disturbance and suicidal ideas. The patient is not nervous/anxious.      Objective:   /82   Pulse 94   Temp 97.8 °F (36.6 °C) (Tympanic)   Ht 5' 8" (1.727 m)   Wt 107.1 kg (236 lb 1.8 oz)   SpO2 99%   BMI 35.90 kg/m²     Physical Exam   Constitutional: He is oriented to person, place, and time. He appears well-developed and well-nourished.   HENT:   Head: Normocephalic and atraumatic.   Right Ear: External ear normal.   Left Ear: External ear normal.   Nose: Nose normal.   Mouth/Throat: Oropharynx is clear and moist.   Eyes: Conjunctivae and EOM are normal. Pupils are equal, round, and reactive to light.   Neck: Normal range of motion. Neck supple.   Cardiovascular: Normal rate, regular rhythm and normal heart sounds.  Exam reveals no gallop and no friction rub.    No murmur heard.  Pulmonary/Chest: Effort normal and breath sounds normal. No respiratory distress. He has no wheezes. He has no rales. He exhibits no " tenderness.   Abdominal: Soft. He exhibits no distension. There is no tenderness.   Musculoskeletal: Normal range of motion.   Lymphadenopathy:     He has no cervical adenopathy.   Neurological: He is alert and oriented to person, place, and time.   Skin: Skin is warm and dry.   Psychiatric: He has a normal mood and affect. His behavior is normal. Judgment and thought content normal.   Vitals reviewed.      Assessment:     1. Testicular swelling    2. Erectile dysfunction, unspecified erectile dysfunction type    3. Oliguria      Plan:   Testicular swelling  -     US Scrotum And Testicles; Future; Expected date: 08/02/2018  -     PSA, Screening; Future; Expected date: 08/02/2018  -     Ambulatory referral to Urology    Erectile dysfunction, unspecified erectile dysfunction type  -     PSA, Screening; Future; Expected date: 08/02/2018  -     Ambulatory referral to Urology    Oliguria  -     PSA, Screening; Future; Expected date: 08/02/2018  -     Ambulatory referral to Urology      Follow-up if symptoms worsen or fail to improve.

## 2018-08-02 NOTE — PATIENT INSTRUCTIONS
BPH (Enlarged Prostate)  The prostate is a gland at the base of the bladder. As some men get older, the prostate may get bigger in size. This problem is called benign prostatic hyperplasia (BPH). BPH puts pressure on the urethra. This is the tube that carries urine from the bladder to the penis. It may interfere with the flow of urine. It may also keep the bladder from emptying fully.    Symptoms of BPH include trouble starting urination and feeling as though the bladder isnt emptying all the way. It also includes a weak urine stream, dribbling and leaking of urine, and frequent and urgent urination (especially at night). BPH can increase the risk of urinary infections. It can also block off urine flow completely. If this occurs, a thin tube (catheter) may be passed into the bladder to help drain urine.  If symptoms are mild, no treatment may be needed right now. If symptoms are more severe, treatment is likely needed. The goal of treatment is to improve urine flow and reduce symptoms. Treatments can include medicine and procedures. Your healthcare provider will discuss treatment options with you as needed.  Home care  The following guidelines will help you care for yourself at home:  · Urinate as soon as you feel the urge. Don't try to hold your urine.  · Don't limit your fluid intake during the day. Drink 6 to 8 glasses of water or liquids a day. This prevents bacteria from building up in the bladder.  · Avoid drinking fluids after dinner to help reduce urination during the night.  · Avoid medicines that can worsen your symptoms. These include certain cold and allergy medicines and antidepressants. Diuretics used for high blood pressure can also worsen symptoms. Talk to your doctor about the medicines you take. Other choices may work better for you.  Prostate cancer screening  BPH does not increase the risk of prostate cancer. But because prostate cancer is a common cancer in men, screening is sometimes  recommended. This may help detect the cancer in its early stages when treatment is most effective. Factors that can increase the risk of prostate cancer include being -American or having a father or brother who had prostate cancer. A high-fat diet may also increase the risk of prostate cancer. Talk to your healthcare provider to see whether you should be screened for prostate cancer.  Follow-up care  Follow up with your healthcare provider, or as advised  To learn more, go to:  · National Kidney & Urologic Diseases Information Clearinghouse  kidney.niddk.nih.gov, 540.441.5408  When to seek medical advice  Call your healthcare provider right away if any of these occur:  · Fever of 100.4°F (38.0°C) or higher, or as advised  · Unable to pass urine for 8 hours  · Increasing pressure or pain in your bladder (lower abdomen)  · Blood in the urine  · Increasing low back pain, not related to injury  · Symptoms of urinary infection (increased urge to urinate, burning when passing urine, foul-smelling urine)  Date Last Reviewed: 7/1/2016 © 2000-2017 [employer]. [address] [place] [address], [place] [address]. All rights reserved. This information is not intended as a substitute for professional medical care. Always follow your healthcare professional's instructions.

## 2018-08-03 LAB
C TRACH DNA SPEC QL NAA+PROBE: NOT DETECTED
N GONORRHOEA DNA SPEC QL NAA+PROBE: NOT DETECTED

## 2018-08-04 LAB — BACTERIA UR CULT: NO GROWTH

## 2018-08-07 ENCOUNTER — LAB VISIT (OUTPATIENT)
Dept: LAB | Facility: HOSPITAL | Age: 48
End: 2018-08-07
Attending: FAMILY MEDICINE
Payer: COMMERCIAL

## 2018-08-07 DIAGNOSIS — R34 OLIGURIA: ICD-10-CM

## 2018-08-07 DIAGNOSIS — N52.9 ERECTILE DYSFUNCTION, UNSPECIFIED ERECTILE DYSFUNCTION TYPE: ICD-10-CM

## 2018-08-07 DIAGNOSIS — N50.89 TESTICULAR SWELLING: ICD-10-CM

## 2018-08-07 LAB — COMPLEXED PSA SERPL-MCNC: 0.77 NG/ML

## 2018-08-07 PROCEDURE — 36415 COLL VENOUS BLD VENIPUNCTURE: CPT

## 2018-08-07 PROCEDURE — 84153 ASSAY OF PSA TOTAL: CPT

## 2018-09-12 ENCOUNTER — CLINICAL SUPPORT (OUTPATIENT)
Dept: CARDIOLOGY | Facility: CLINIC | Age: 48
End: 2018-09-12
Payer: COMMERCIAL

## 2018-09-12 ENCOUNTER — HOSPITAL ENCOUNTER (OUTPATIENT)
Dept: RADIOLOGY | Facility: HOSPITAL | Age: 48
Discharge: HOME | End: 2018-09-12
Attending: FAMILY MEDICINE
Payer: COMMERCIAL

## 2018-09-12 ENCOUNTER — OFFICE VISIT (OUTPATIENT)
Dept: INTERNAL MEDICINE | Facility: CLINIC | Age: 48
End: 2018-09-12
Payer: COMMERCIAL

## 2018-09-12 VITALS
SYSTOLIC BLOOD PRESSURE: 124 MMHG | HEART RATE: 77 BPM | HEIGHT: 68 IN | BODY MASS INDEX: 35.45 KG/M2 | DIASTOLIC BLOOD PRESSURE: 84 MMHG | WEIGHT: 233.94 LBS | OXYGEN SATURATION: 97 % | TEMPERATURE: 98 F

## 2018-09-12 DIAGNOSIS — E66.01 SEVERE OBESITY (BMI 35.0-39.9): ICD-10-CM

## 2018-09-12 DIAGNOSIS — M48.02 CERVICAL STENOSIS OF SPINAL CANAL: ICD-10-CM

## 2018-09-12 DIAGNOSIS — Z01.818 PRE-OPERATIVE CLEARANCE: ICD-10-CM

## 2018-09-12 DIAGNOSIS — K21.9 GASTROESOPHAGEAL REFLUX DISEASE, ESOPHAGITIS PRESENCE NOT SPECIFIED: ICD-10-CM

## 2018-09-12 DIAGNOSIS — M50.00 CERVICAL DISC DISORDER WITH MYELOPATHY: ICD-10-CM

## 2018-09-12 DIAGNOSIS — M50.00 CERVICAL DISC DISORDER WITH MYELOPATHY: Primary | ICD-10-CM

## 2018-09-12 DIAGNOSIS — I10 ESSENTIAL HYPERTENSION: ICD-10-CM

## 2018-09-12 DIAGNOSIS — M54.12 CERVICAL RADICULOPATHY: ICD-10-CM

## 2018-09-12 PROCEDURE — 71046 X-RAY EXAM CHEST 2 VIEWS: CPT | Mod: TC,FY,PO

## 2018-09-12 PROCEDURE — 99214 OFFICE O/P EST MOD 30 MIN: CPT | Mod: S$GLB,,, | Performed by: FAMILY MEDICINE

## 2018-09-12 PROCEDURE — 71046 X-RAY EXAM CHEST 2 VIEWS: CPT | Mod: 26,,, | Performed by: RADIOLOGY

## 2018-09-12 PROCEDURE — 93000 ELECTROCARDIOGRAM COMPLETE: CPT | Mod: S$GLB,,, | Performed by: INTERNAL MEDICINE

## 2018-09-12 PROCEDURE — 87081 CULTURE SCREEN ONLY: CPT

## 2018-09-12 PROCEDURE — 99999 PR PBB SHADOW E&M-EST. PATIENT-LVL IV: CPT | Mod: PBBFAC,,, | Performed by: FAMILY MEDICINE

## 2018-09-12 RX ORDER — TAMSULOSIN HYDROCHLORIDE 0.4 MG/1
CAPSULE ORAL
Refills: 1 | COMMUNITY
Start: 2018-08-06 | End: 2018-09-12

## 2018-09-12 RX ORDER — IBUPROFEN 800 MG/1
TABLET ORAL
Refills: 0 | COMMUNITY
Start: 2018-08-06 | End: 2018-09-12

## 2018-09-12 NOTE — PROGRESS NOTES
"Subjective:       Patient ID: Guillaume Conde III is a 48 y.o. male.    Chief Complaint: Pre-op Exam    48-year-old  male patient with Patient Active Problem List:     GERD (gastroesophageal reflux disease)     Essential hypertension     Erectile dysfunction     Severe obesity (BMI 35.0-39.9)  Here for preop clearance for cervical spine surgery C3-C7 ACDF by Dr. Gordy Le on 10/01/2018.  Patient secondary to cervical spinal stenosis with myelopathy and radiculopathy has been having ongoing neck pain for the past 2-3 years especially on the right side occasionally causing tingling and numbness sensation to the right arm, patient has been taking over-the-counter NSAIDs and anti-inflammatory medications with no significant relief and would like to proceed with the surgery.  Has been taking blood pressure medication regularly and acid reflux has been stable.  Denies any fever with chills cough chest pain or difficulty breathing, changes in bowel movements or trouble with urine.      Review of Systems   Constitutional: Negative for appetite change and fatigue.   Eyes: Negative for visual disturbance.   Respiratory: Negative for shortness of breath.    Cardiovascular: Negative for chest pain, palpitations and leg swelling.   Gastrointestinal: Negative for abdominal pain, nausea and vomiting.   Musculoskeletal: Positive for myalgias and neck pain.   Skin: Negative for rash.   Neurological: Positive for numbness. Negative for weakness and headaches.   Psychiatric/Behavioral: Negative for sleep disturbance.         /84 (BP Location: Right arm, Patient Position: Sitting)   Pulse 77   Temp 97.8 °F (36.6 °C) (Oral)   Ht 5' 8" (1.727 m)   Wt 106.1 kg (233 lb 14.5 oz)   SpO2 97%   BMI 35.57 kg/m²   Objective:      Physical Exam   Constitutional: He is oriented to person, place, and time. He appears well-developed and well-nourished.   HENT:   Head: Normocephalic and atraumatic.   Mouth/Throat: " Oropharynx is clear and moist.   Cardiovascular: Normal rate, regular rhythm and normal heart sounds.   No murmur heard.  Pulmonary/Chest: Effort normal and breath sounds normal. He has no wheezes.   Abdominal: Soft. Bowel sounds are normal. There is no tenderness.   Musculoskeletal: He exhibits tenderness. He exhibits no edema.   Positive for paraspinal cervical muscle tenderness on the right side   Neurological: He is alert and oriented to person, place, and time.   Skin: Skin is warm and dry. No rash noted.   Psychiatric: He has a normal mood and affect.       Lab Visit on 09/12/2018   Component Date Value Ref Range Status    WBC 09/12/2018 4.36  3.90 - 12.70 K/uL Final    RBC 09/12/2018 4.34* 4.60 - 6.20 M/uL Final    Hemoglobin 09/12/2018 12.6* 14.0 - 18.0 g/dL Final    Hematocrit 09/12/2018 41.2  40.0 - 54.0 % Final    MCV 09/12/2018 95  82 - 98 fL Final    MCH 09/12/2018 29.0  27.0 - 31.0 pg Final    MCHC 09/12/2018 30.6* 32.0 - 36.0 g/dL Final    RDW 09/12/2018 15.6* 11.5 - 14.5 % Final    Platelets 09/12/2018 246  150 - 350 K/uL Final    MPV 09/12/2018 11.2  9.2 - 12.9 fL Final    Immature Granulocytes 09/12/2018 0.0  0.0 - 0.5 % Final    Gran # (ANC) 09/12/2018 2.1  1.8 - 7.7 K/uL Final    Immature Grans (Abs) 09/12/2018 0.00  0.00 - 0.04 K/uL Final    Comment: Mild elevation in immature granulocytes is non specific and   can be seen in a variety of conditions including stress response,   acute inflammation, trauma and pregnancy. Correlation with other   laboratory and clinical findings is essential.      Lymph # 09/12/2018 1.6  1.0 - 4.8 K/uL Final    Mono # 09/12/2018 0.5  0.3 - 1.0 K/uL Final    Eos # 09/12/2018 0.1  0.0 - 0.5 K/uL Final    Baso # 09/12/2018 0.02  0.00 - 0.20 K/uL Final    nRBC 09/12/2018 0  0 /100 WBC Final    Gran% 09/12/2018 48.4  38.0 - 73.0 % Final    Lymph% 09/12/2018 37.6  18.0 - 48.0 % Final    Mono% 09/12/2018 11.9  4.0 - 15.0 % Final    Eosinophil%  09/12/2018 1.6  0.0 - 8.0 % Final    Basophil% 09/12/2018 0.5  0.0 - 1.9 % Final    Differential Method 09/12/2018 Automated   Final    Sodium 09/12/2018 140  136 - 145 mmol/L Final    Potassium 09/12/2018 3.9  3.5 - 5.1 mmol/L Final    Chloride 09/12/2018 101  95 - 110 mmol/L Final    CO2 09/12/2018 32* 23 - 29 mmol/L Final    Glucose 09/12/2018 86  70 - 110 mg/dL Final    BUN, Bld 09/12/2018 12  6 - 20 mg/dL Final    Creatinine 09/12/2018 1.2  0.5 - 1.4 mg/dL Final    Calcium 09/12/2018 10.0  8.7 - 10.5 mg/dL Final    Total Protein 09/12/2018 8.0  6.0 - 8.4 g/dL Final    Albumin 09/12/2018 4.1  3.5 - 5.2 g/dL Final    Total Bilirubin 09/12/2018 1.3* 0.1 - 1.0 mg/dL Final    Comment: For infants and newborns, interpretation of results should be based  on gestational age, weight and in agreement with clinical  observations.  Premature Infant recommended reference ranges:  Up to 24 hours.............<8.0 mg/dL  Up to 48 hours............<12.0 mg/dL  3-5 days..................<15.0 mg/dL  6-29 days.................<15.0 mg/dL      Alkaline Phosphatase 09/12/2018 70  55 - 135 U/L Final    AST 09/12/2018 23  10 - 40 U/L Final    ALT 09/12/2018 16  10 - 44 U/L Final    Anion Gap 09/12/2018 7* 8 - 16 mmol/L Final    eGFR if African American 09/12/2018 >60.0  >60 mL/min/1.73 m^2 Final    eGFR if non African American 09/12/2018 >60.0  >60 mL/min/1.73 m^2 Final    Comment: Calculation used to obtain the estimated glomerular filtration  rate (eGFR) is the CKD-EPI equation.       aPTT 09/12/2018 27.5  21.0 - 32.0 sec Final    aPTT therapeutic range = 39-69 seconds    Prothrombin Time 09/12/2018 10.2  9.0 - 12.5 sec Final    INR 09/12/2018 1.0  0.8 - 1.2 Final    Comment: Coumadin Therapy:  2.0 - 3.0 for INR for all indicators except mechanical heart valves  and antiphospholipid syndromes which should use 2.5 - 3.5.      Cholesterol 09/12/2018 192  120 - 199 mg/dL Final    Comment: The National  Cholesterol Education Program (NCEP) has set the  following guidelines (reference ranges) for Cholesterol:  Optimal.....................<200 mg/dL  Borderline High.............200-239 mg/dL  High........................> or = 240 mg/dL      Triglycerides 09/12/2018 73  30 - 150 mg/dL Final    Comment: The National Cholesterol Education Program (NCEP) has set the  following guidelines (reference values) for triglycerides:  Normal......................<150 mg/dL  Borderline High.............150-199 mg/dL  High........................200-499 mg/dL      HDL 09/12/2018 64  40 - 75 mg/dL Final    Comment: The National Cholesterol Education Program (NCEP) has set the  following guidelines (reference values) for HDL Cholesterol:  Low...............<40 mg/dL  Optimal...........>60 mg/dL      LDL Cholesterol 09/12/2018 113.4  63.0 - 159.0 mg/dL Final    Comment: The National Cholesterol Education Program (NCEP) has set the  following guidelines (reference values) for LDL Cholesterol:  Optimal.......................<130 mg/dL  Borderline High...............130-159 mg/dL  High..........................160-189 mg/dL  Very High.....................>190 mg/dL      HDL/Chol Ratio 09/12/2018 33.3  20.0 - 50.0 % Final    Total Cholesterol/HDL Ratio 09/12/2018 3.0  2.0 - 5.0 Final    Non-HDL Cholesterol 09/12/2018 128  mg/dL Final    Comment: Risk category and Non-HDL cholesterol goals:  Coronary heart disease (CHD)or equivalent (10-year risk of CHD >20%):  Non-HDL cholesterol goal     <130 mg/dL  Two or more CHD risk factors and 10-year risk of CHD <= 20%:  Non-HDL cholesterol goal     <160 mg/dL  0 to 1 CHD risk factor:  Non-HDL cholesterol goal     <190 mg/dL     Lab Visit on 09/12/2018   Component Date Value Ref Range Status    Specimen UA 09/12/2018 Urine, Clean Catch   Final    Color, UA 09/12/2018 Yellow  Yellow, Straw, Alexa Final    Appearance, UA 09/12/2018 Clear  Clear Final    pH, UA 09/12/2018 7.0  5.0 - 8.0  Final    Specific Gravity, UA 09/12/2018 1.010  1.005 - 1.030 Final    Protein, UA 09/12/2018 Negative  Negative Final    Comment: Recommend a 24 hour urine protein or a urine   protein/creatinine ratio if globulin induced proteinuria is  clinically suspected.      Glucose, UA 09/12/2018 Negative  Negative Final    Ketones, UA 09/12/2018 Negative  Negative Final    Bilirubin (UA) 09/12/2018 Negative  Negative Final    Occult Blood UA 09/12/2018 Negative  Negative Final    Nitrite, UA 09/12/2018 Negative  Negative Final    Leukocytes, UA 09/12/2018 Negative  Negative Final    Urine Culture, Routine 09/12/2018 No growth   Final   Office Visit on 09/12/2018   Component Date Value Ref Range Status    MRSA Surveillance Screen 09/12/2018 No MRSA isolated   Final       Chest xray     The lungs are clear and free of infiltrate.  No pleural effusion or pneumothorax. The heart is not enlarged. Surgical clips noted in the right upper quadrant.      Impression       1.  No acute cardiopulmonary process.     EKG   Vent. Rate : 070 BPM     Atrial Rate : 070 BPM     P-R Int : 176 ms          QRS Dur : 094 ms      QT Int : 382 ms       P-R-T Axes : 048 066 060 degrees     QTc Int : 412 ms    Normal sinus rhythm  Minimal voltage criteria for LVH, may be normal variant  ST elevation, consider early repolarization  Borderline Abnormal ECG  When compared with ECG of 14-JUL-2016 08:32,  No significant change was found  Confirmed by SOCRATES KAHN, JOSE JUAN ([id number]) on 9/12/2018 10:32:51 PM    Assessment:       1. Cervical disc disorder with myelopathy    2. Cervical stenosis of spinal canal    3. Cervical radiculopathy    4. Pre-operative clearance    5. Essential hypertension    6. Gastroesophageal reflux disease, esophagitis presence not specified    7. Severe obesity (BMI 35.0-39.9)        Plan:   Cervical disc disorder with myelopathy  -     CBC auto differential; Future; Expected date: 09/12/2018  -     Comprehensive metabolic panel;  Future; Expected date: 09/12/2018  -     Urinalysis; Future; Expected date: 09/12/2018  -     EKG 12-lead; Future; Expected date: 09/12/2018  -     X-Ray Chest PA And Lateral  -     Urine culture; Future; Expected date: 09/12/2018  -     APTT; Future; Expected date: 09/12/2018  -     Protime-INR; Future; Expected date: 09/12/2018  -     Culture, MRSA  Cervical stenosis of spinal canal  -     CBC auto differential; Future; Expected date: 09/12/2018  -     Comprehensive metabolic panel; Future; Expected date: 09/12/2018  -     Urinalysis; Future; Expected date: 09/12/2018  -     EKG 12-lead; Future; Expected date: 09/12/2018  -     X-Ray Chest PA And Lateral  -     Urine culture; Future; Expected date: 09/12/2018  -     APTT; Future; Expected date: 09/12/2018  -     Protime-INR; Future; Expected date: 09/12/2018  -     Culture, MRSA  Cervical radiculopathy  -     CBC auto differential; Future; Expected date: 09/12/2018  -     Comprehensive metabolic panel; Future; Expected date: 09/12/2018  -     Urinalysis; Future; Expected date: 09/12/2018  -     EKG 12-lead; Future; Expected date: 09/12/2018  -     X-Ray Chest PA And Lateral  -     Urine culture; Future; Expected date: 09/12/2018  -     APTT; Future; Expected date: 09/12/2018  -     Protime-INR; Future; Expected date: 09/12/2018  -     Culture, MRSA  Pre-operative clearance  -     CBC auto differential; Future; Expected date: 09/12/2018  -     Comprehensive metabolic panel; Future; Expected date: 09/12/2018  -     Urinalysis; Future; Expected date: 09/12/2018  -     EKG 12-lead; Future; Expected date: 09/12/2018  -     X-Ray Chest PA And Lateral  -     Urine culture; Future; Expected date: 09/12/2018  -     APTT; Future; Expected date: 09/12/2018  -     Protime-INR; Future; Expected date: 09/12/2018  -     Culture, MRSA    Will schedule preop labs as requested by orthopedic surgeon and will fax it when available at [phone] theto Dr Gordy Le   Patient  clinically stable    Reviewed preop labs which looks stable.  Patient is at low risk for the proposed surgery.       Essential hypertension  -     Lipid panel; Future; Expected date: 09/12/2018  Blood pressure is stable today currently on losartan hydrochlorothiazide 100/12.5 mg daily    Gastroesophageal reflux disease, esophagitis presence not specified-stable on pantoprazole 40 mg daily    Severe obesity (BMI 35.0-39.9)-Lifestyle modifications recommended to lose weight with BMI 35 with diet and exercise

## 2018-09-14 LAB — MRSA SPEC QL CULT: NORMAL

## 2018-09-19 ENCOUNTER — TELEPHONE (OUTPATIENT)
Dept: INTERNAL MEDICINE | Facility: CLINIC | Age: 48
End: 2018-09-19

## 2018-09-19 NOTE — TELEPHONE ENCOUNTER
Pre-op clearance along with EKG strip faxed back to [facility]. [facility]    ----- Message from Nicole Azul MA sent at 9/13/2018 11:42 AM CDT -----  Caller needs pre op clearance fax to [phone] for surgery on 10/01 if you have any question you can call [phone]

## 2019-01-31 DIAGNOSIS — I10 ESSENTIAL HYPERTENSION: ICD-10-CM

## 2019-01-31 DIAGNOSIS — K21.9 GASTROESOPHAGEAL REFLUX DISEASE, ESOPHAGITIS PRESENCE NOT SPECIFIED: ICD-10-CM

## 2019-01-31 RX ORDER — LOSARTAN POTASSIUM AND HYDROCHLOROTHIAZIDE 12.5; 1 MG/1; MG/1
TABLET ORAL
Qty: 90 TABLET | Refills: 0 | Status: SHIPPED | OUTPATIENT
Start: 2019-01-31 | End: 2019-03-10 | Stop reason: CLARIF

## 2019-01-31 RX ORDER — PANTOPRAZOLE SODIUM 40 MG/1
TABLET, DELAYED RELEASE ORAL
Qty: 90 TABLET | Refills: 0 | Status: SHIPPED | OUTPATIENT
Start: 2019-01-31 | End: 2019-05-15 | Stop reason: SDUPTHER

## 2019-03-07 ENCOUNTER — PATIENT MESSAGE (OUTPATIENT)
Dept: INTERNAL MEDICINE | Facility: CLINIC | Age: 49
End: 2019-03-07

## 2019-03-10 ENCOUNTER — TELEPHONE (OUTPATIENT)
Dept: INTERNAL MEDICINE | Facility: CLINIC | Age: 49
End: 2019-03-10

## 2019-03-10 DIAGNOSIS — I10 ESSENTIAL HYPERTENSION: Primary | ICD-10-CM

## 2019-03-10 RX ORDER — TELMISARTAN AND HYDROCHLORTHIAZIDE 80; 12.5 MG/1; MG/1
1 TABLET ORAL DAILY
Qty: 90 TABLET | Refills: 1 | Status: SHIPPED | OUTPATIENT
Start: 2019-03-10 | End: 2019-09-23 | Stop reason: SDUPTHER

## 2019-05-15 DIAGNOSIS — K21.9 GASTROESOPHAGEAL REFLUX DISEASE, ESOPHAGITIS PRESENCE NOT SPECIFIED: ICD-10-CM

## 2019-05-15 RX ORDER — PANTOPRAZOLE SODIUM 40 MG/1
TABLET, DELAYED RELEASE ORAL
Qty: 90 TABLET | Refills: 0 | Status: SHIPPED | OUTPATIENT
Start: 2019-05-15 | End: 2019-08-24 | Stop reason: SDUPTHER

## 2019-08-24 DIAGNOSIS — K21.9 GASTROESOPHAGEAL REFLUX DISEASE, ESOPHAGITIS PRESENCE NOT SPECIFIED: ICD-10-CM

## 2019-08-26 RX ORDER — PANTOPRAZOLE SODIUM 40 MG/1
TABLET, DELAYED RELEASE ORAL
Qty: 90 TABLET | Refills: 0 | Status: SHIPPED | OUTPATIENT
Start: 2019-08-26 | End: 2019-11-07 | Stop reason: SDUPTHER

## 2019-09-20 ENCOUNTER — PATIENT OUTREACH (OUTPATIENT)
Dept: ADMINISTRATIVE | Facility: HOSPITAL | Age: 49
End: 2019-09-20

## 2019-09-23 DIAGNOSIS — I10 ESSENTIAL HYPERTENSION: ICD-10-CM

## 2019-09-23 RX ORDER — TELMISARTAN AND HYDROCHLORTHIAZIDE 80; 12.5 MG/1; MG/1
1 TABLET ORAL DAILY
Qty: 30 TABLET | Refills: 0 | Status: SHIPPED | OUTPATIENT
Start: 2019-09-23 | End: 2019-10-21 | Stop reason: SDUPTHER

## 2019-09-23 NOTE — TELEPHONE ENCOUNTER
[email or web address] refill request sent to Dr. Burger for authorization [Micardis HCT], pt past due for appt - pt advised via [employer] to call for appt or sched via [employer].    LV 09/12/18

## 2019-10-21 DIAGNOSIS — I10 ESSENTIAL HYPERTENSION: ICD-10-CM

## 2019-10-21 RX ORDER — TELMISARTAN AND HYDROCHLORTHIAZIDE 80; 12.5 MG/1; MG/1
TABLET ORAL
Qty: 30 TABLET | Refills: 0 | Status: SHIPPED | OUTPATIENT
Start: 2019-10-21 | End: 2019-12-09 | Stop reason: SDUPTHER

## 2019-10-24 ENCOUNTER — OFFICE VISIT (OUTPATIENT)
Dept: INTERNAL MEDICINE | Facility: CLINIC | Age: 49
End: 2019-10-24

## 2019-10-24 ENCOUNTER — CLINICAL SUPPORT (OUTPATIENT)
Dept: INTERNAL MEDICINE | Facility: CLINIC | Age: 49
End: 2019-10-24
Payer: COMMERCIAL

## 2019-10-24 ENCOUNTER — OFFICE VISIT (OUTPATIENT)
Dept: INTERNAL MEDICINE | Facility: CLINIC | Age: 49
End: 2019-10-24
Payer: COMMERCIAL

## 2019-10-24 VITALS
SYSTOLIC BLOOD PRESSURE: 100 MMHG | OXYGEN SATURATION: 99 % | WEIGHT: 228.38 LBS | DIASTOLIC BLOOD PRESSURE: 62 MMHG | BODY MASS INDEX: 34.73 KG/M2 | HEART RATE: 74 BPM | TEMPERATURE: 98 F

## 2019-10-24 DIAGNOSIS — Z00.00 ROUTINE GENERAL MEDICAL EXAMINATION AT A HEALTH CARE FACILITY: Primary | ICD-10-CM

## 2019-10-24 DIAGNOSIS — E66.9 OBESITY (BMI 30.0-34.9): ICD-10-CM

## 2019-10-24 DIAGNOSIS — K21.9 GASTROESOPHAGEAL REFLUX DISEASE, ESOPHAGITIS PRESENCE NOT SPECIFIED: ICD-10-CM

## 2019-10-24 DIAGNOSIS — M47.26 OSTEOARTHRITIS OF SPINE WITH RADICULOPATHY, LUMBAR REGION: ICD-10-CM

## 2019-10-24 DIAGNOSIS — I10 ESSENTIAL HYPERTENSION: ICD-10-CM

## 2019-10-24 PROBLEM — E66.01 SEVERE OBESITY WITH BODY MASS INDEX (BMI) OF 35.0 TO 39.9 WITH SERIOUS COMORBIDITY: Status: RESOLVED | Noted: 2018-09-12 | Resolved: 2019-10-24

## 2019-10-24 PROBLEM — N52.9 ERECTILE DYSFUNCTION: Status: RESOLVED | Noted: 2018-07-02 | Resolved: 2019-10-24

## 2019-10-24 LAB
ALBUMIN SERPL BCP-MCNC: 4 G/DL (ref 3.5–5.2)
ALP SERPL-CCNC: 70 U/L (ref 55–135)
ALT SERPL W/O P-5'-P-CCNC: 11 U/L (ref 10–44)
ANION GAP SERPL CALC-SCNC: 9 MMOL/L (ref 8–16)
AST SERPL-CCNC: 17 U/L (ref 10–40)
BILIRUB SERPL-MCNC: 0.9 MG/DL (ref 0.1–1)
BUN SERPL-MCNC: 13 MG/DL (ref 6–20)
CALCIUM SERPL-MCNC: 9.8 MG/DL (ref 8.7–10.5)
CHLORIDE SERPL-SCNC: 103 MMOL/L (ref 95–110)
CHOLEST SERPL-MCNC: 188 MG/DL (ref 120–199)
CHOLEST/HDLC SERPL: 3.4 {RATIO} (ref 2–5)
CO2 SERPL-SCNC: 31 MMOL/L (ref 23–29)
COMPLEXED PSA SERPL-MCNC: 0.84 NG/ML (ref 0–4)
CREAT SERPL-MCNC: 1.2 MG/DL (ref 0.5–1.4)
ERYTHROCYTE [DISTWIDTH] IN BLOOD BY AUTOMATED COUNT: 15 % (ref 11.5–14.5)
EST. GFR  (AFRICAN AMERICAN): >60 ML/MIN/1.73 M^2
EST. GFR  (NON AFRICAN AMERICAN): >60 ML/MIN/1.73 M^2
ESTIMATED AVG GLUCOSE: 108 MG/DL (ref 68–131)
GLUCOSE SERPL-MCNC: 87 MG/DL (ref 70–110)
HBA1C MFR BLD HPLC: 5.4 % (ref 4–5.6)
HCT VFR BLD AUTO: 41 % (ref 40–54)
HDLC SERPL-MCNC: 55 MG/DL (ref 40–75)
HDLC SERPL: 29.3 % (ref 20–50)
HGB BLD-MCNC: 13 G/DL (ref 14–18)
LDLC SERPL CALC-MCNC: 117.4 MG/DL (ref 63–159)
MCH RBC QN AUTO: 29.5 PG (ref 27–31)
MCHC RBC AUTO-ENTMCNC: 31.7 G/DL (ref 32–36)
MCV RBC AUTO: 93 FL (ref 82–98)
NONHDLC SERPL-MCNC: 133 MG/DL
PLATELET # BLD AUTO: 239 K/UL (ref 150–350)
PMV BLD AUTO: 10.3 FL (ref 9.2–12.9)
POTASSIUM SERPL-SCNC: 3.8 MMOL/L (ref 3.5–5.1)
PROT SERPL-MCNC: 7.7 G/DL (ref 6–8.4)
RBC # BLD AUTO: 4.41 M/UL (ref 4.6–6.2)
SODIUM SERPL-SCNC: 143 MMOL/L (ref 136–145)
TRIGL SERPL-MCNC: 78 MG/DL (ref 30–150)
TSH SERPL DL<=0.005 MIU/L-ACNC: 0.86 UIU/ML (ref 0.4–4)
WBC # BLD AUTO: 3.96 K/UL (ref 3.9–12.7)

## 2019-10-24 PROCEDURE — 83036 HEMOGLOBIN GLYCOSYLATED A1C: CPT

## 2019-10-24 PROCEDURE — 99396 PREV VISIT EST AGE 40-64: CPT | Mod: 25,S$GLB,, | Performed by: FAMILY MEDICINE

## 2019-10-24 PROCEDURE — 99999 PR PBB SHADOW E&M-EST. PATIENT-LVL III: ICD-10-PCS | Mod: PBBFAC,,, | Performed by: FAMILY MEDICINE

## 2019-10-24 PROCEDURE — 99386 PREV VISIT NEW AGE 40-64: CPT | Mod: 25,S$GLB,, | Performed by: FAMILY MEDICINE

## 2019-10-24 PROCEDURE — 99396 PR PREVENTIVE VISIT,EST,40-64: ICD-10-PCS | Mod: 25,S$GLB,, | Performed by: FAMILY MEDICINE

## 2019-10-24 PROCEDURE — 84443 ASSAY THYROID STIM HORMONE: CPT

## 2019-10-24 PROCEDURE — 90686 IIV4 VACC NO PRSV 0.5 ML IM: CPT | Mod: S$GLB,,, | Performed by: FAMILY MEDICINE

## 2019-10-24 PROCEDURE — 84153 ASSAY OF PSA TOTAL: CPT

## 2019-10-24 PROCEDURE — 80053 COMPREHEN METABOLIC PANEL: CPT

## 2019-10-24 PROCEDURE — 85027 COMPLETE CBC AUTOMATED: CPT

## 2019-10-24 PROCEDURE — 90471 FLU VACCINE (QUAD) GREATER THAN OR EQUAL TO 3YO PRESERVATIVE FREE IM: ICD-10-PCS | Mod: S$GLB,,, | Performed by: FAMILY MEDICINE

## 2019-10-24 PROCEDURE — 99999 PR PBB SHADOW E&M-EST. PATIENT-LVL III: CPT | Mod: PBBFAC,,, | Performed by: FAMILY MEDICINE

## 2019-10-24 PROCEDURE — 90686 FLU VACCINE (QUAD) GREATER THAN OR EQUAL TO 3YO PRESERVATIVE FREE IM: ICD-10-PCS | Mod: S$GLB,,, | Performed by: FAMILY MEDICINE

## 2019-10-24 PROCEDURE — 99999 PR PBB SHADOW E&M-EST. PATIENT-LVL I: CPT | Mod: PBBFAC,,, | Performed by: FAMILY MEDICINE

## 2019-10-24 PROCEDURE — 99999 PR PBB SHADOW E&M-EST. PATIENT-LVL I: ICD-10-PCS | Mod: PBBFAC,,, | Performed by: FAMILY MEDICINE

## 2019-10-24 PROCEDURE — 90471 IMMUNIZATION ADMIN: CPT | Mod: S$GLB,,, | Performed by: FAMILY MEDICINE

## 2019-10-24 PROCEDURE — 99386 PR PREVENTIVE VISIT,NEW,40-64: ICD-10-PCS | Mod: 25,S$GLB,, | Performed by: FAMILY MEDICINE

## 2019-10-24 PROCEDURE — 80061 LIPID PANEL: CPT

## 2019-10-24 RX ORDER — DIAZEPAM 5 MG/1
TABLET ORAL
Refills: 0 | COMMUNITY
Start: 2019-09-24 | End: 2019-10-24

## 2019-10-24 NOTE — PROGRESS NOTES
Subjective:       Patient ID: Guillaume Conde III is a 49 y.o. male.    Chief Complaint:  Annual , executive health    49-year-old  male patient with Patient Active Problem List:     GERD (gastroesophageal reflux disease)     Essential hypertension     Obesity (BMI 30.0-34.9)     Osteoarthritis of spine with radiculopathy, lumbar region  Here for executive health physical for [employer].   Patient reports that he has been taking his blood pressure medication regularly, had neck surgery by Dr. Le last year and has been clinically doing well  Continues to have chronic low back pain with sciatica and would like to consider back surgery, has tried steroid injections with no relief.   Has not been exercising lately secondary to chronic low back pain  Acid reflux has been Stable  Denies any chest pain or difficulty breathing or palpitations, nausea vomiting    Review of Systems   Constitutional: Negative for appetite change and fatigue.   Eyes: Negative for visual disturbance.   Respiratory: Negative for shortness of breath.    Cardiovascular: Negative for chest pain, palpitations and leg swelling.   Gastrointestinal: Negative for abdominal pain, nausea and vomiting.   Musculoskeletal: Positive for back pain, gait problem and myalgias. Negative for neck pain.   Skin: Negative for rash.   Neurological: Negative for weakness, numbness and headaches.   Psychiatric/Behavioral: Negative for sleep disturbance.         There were no vitals taken for this visit.  Objective:      Physical Exam   Constitutional: He is oriented to person, place, and time. He appears well-developed and well-nourished.   HENT:   Head: Normocephalic and atraumatic.   Mouth/Throat: Oropharynx is clear and moist.   Cardiovascular: Normal rate, regular rhythm and normal heart sounds.   No murmur heard.  Pulmonary/Chest: Effort normal and breath sounds normal. He has no wheezes.   Abdominal: Soft. Bowel sounds are normal. There is no  tenderness.   Musculoskeletal: He exhibits tenderness. He exhibits no edema.   Positive for paraspinal lumbar muscle tenderness in the midline   Neurological: He is alert and oriented to person, place, and time.   Skin: Skin is warm and dry. No rash noted.   Psychiatric: He has a normal mood and affect.         Assessment/Plan:   Routine general medical examination at a health care facility  Vital signs stable today  Clinical exam stable  Encouraged to start lifestyle modifications with low-fat and low-cholesterol diet and exercise 30 min daily  Flu shot given today  Will check nonfasting labs today    Essential hypertension  Blood pressure is stable today currently on telmisartan hydrochlorothiazide 80/12.5 mg daily    Gastroesophageal reflux disease, esophagitis presence not specified  Stable on pantoprazole 40 mg daily    Osteoarthritis of spine with radiculopathy, lumbar region  Followed by Dr. Le, status post СВЕТЛАНА and physical therapy with ongoing symptoms    Obesity (BMI 30.0-34.9)  Lifestyle modifications recommended    Other orders  -     Influenza - Quadrivalent (PF)

## 2019-11-07 DIAGNOSIS — I10 ESSENTIAL HYPERTENSION: ICD-10-CM

## 2019-11-07 DIAGNOSIS — K21.9 GASTROESOPHAGEAL REFLUX DISEASE, ESOPHAGITIS PRESENCE NOT SPECIFIED: ICD-10-CM

## 2019-11-07 RX ORDER — PANTOPRAZOLE SODIUM 40 MG/1
40 TABLET, DELAYED RELEASE ORAL DAILY
Qty: 90 TABLET | Refills: 2 | Status: SHIPPED | OUTPATIENT
Start: 2019-11-07 | End: 2019-12-09 | Stop reason: SDUPTHER

## 2019-11-07 NOTE — TELEPHONE ENCOUNTER
----- Message from Radha Joe sent at 11/7/2019 12:19 PM CST -----  Contact: pt  Type:  RX Refill Request    Who Called: patient  Refill or New Rx:refill  RX Name and Strength:Pantoprazole 40mg and Telmisartan 80-12.5mg  How is the patient currently taking it? (ex. 1XDay):na  Is this a 30 day or 90 day RX:90day  Preferred Pharmacy with phone number:[facility]  Local or Mail Order:Local  Ordering Provider:Dr Burger  Would the patient rather a call back or a response via [facility]? Call back  Best Call Back Number:[phone]  Additional Information: na

## 2019-12-09 DIAGNOSIS — I10 ESSENTIAL HYPERTENSION: ICD-10-CM

## 2019-12-09 DIAGNOSIS — K21.9 GASTROESOPHAGEAL REFLUX DISEASE, ESOPHAGITIS PRESENCE NOT SPECIFIED: ICD-10-CM

## 2019-12-09 RX ORDER — TELMISARTAN AND HYDROCHLORTHIAZIDE 80; 12.5 MG/1; MG/1
1 TABLET ORAL DAILY
Qty: 90 TABLET | Refills: 1 | Status: SHIPPED | OUTPATIENT
Start: 2019-12-09 | End: 2020-01-03 | Stop reason: DRUGHIGH

## 2019-12-09 RX ORDER — PANTOPRAZOLE SODIUM 40 MG/1
40 TABLET, DELAYED RELEASE ORAL DAILY
Qty: 90 TABLET | Refills: 1 | Status: SHIPPED | OUTPATIENT
Start: 2019-12-09 | End: 2021-02-05 | Stop reason: SDUPTHER

## 2019-12-09 NOTE — TELEPHONE ENCOUNTER
----- Message from Lisa Lozada sent at 12/9/2019  9:15 AM CST -----  Contact: pt   Type:  RX Refill Request    Who Called: Guillaume Franck Conde III   Refill or New Rx:refill  RX Name and Strength: pantoprazole (PROTONIX) 40 MG tablet  How is the patient currently taking it? (ex. 1XDay):  Is this a 30 day or 90 day RX:90  Preferred Pharmacy with phone number:  [id number] - [place] - [address]  [address]  [facility] [address]  Phone: [phone] Fax: [phone]  Local or Mail Order: Local   Ordering Provider:Tao   Would the patient rather a call back or a response via [facility]? Call back   Best Call Back Number: [phone] (home)   Additional Information:       Type:  RX Refill Request    Who Called: Guillaume Franck Conde III   Refill or New Rx:refill  RX Name and Strength: telmisartan-hydrochlorothiazide (MICARDIS HCT) 80-12.5 mg per tablet  How is the patient currently taking it? (ex. 1XDay):  Is this a 30 day or 90 day RX:90  Preferred Pharmacy with phone number:  [facility] #[id number] - [place] - [address]  [address]  [facility] [address]  Phone: [phone] Fax: [phone]  Local or Mail Order: Local   Ordering Provider:Tao   Would the patient rather a call back or a response via [facility]? Call back   Best Call Back Number: [phone] (home)   Additional Information:

## 2020-01-03 ENCOUNTER — OFFICE VISIT (OUTPATIENT)
Dept: INTERNAL MEDICINE | Facility: CLINIC | Age: 50
End: 2020-01-03
Payer: COMMERCIAL

## 2020-01-03 ENCOUNTER — CLINICAL SUPPORT (OUTPATIENT)
Dept: CARDIOLOGY | Facility: CLINIC | Age: 50
End: 2020-01-03
Payer: COMMERCIAL

## 2020-01-03 ENCOUNTER — HOSPITAL ENCOUNTER (OUTPATIENT)
Dept: RADIOLOGY | Facility: HOSPITAL | Age: 50
Discharge: HOME | End: 2020-01-03
Attending: FAMILY MEDICINE
Payer: COMMERCIAL

## 2020-01-03 VITALS
WEIGHT: 238.31 LBS | DIASTOLIC BLOOD PRESSURE: 80 MMHG | OXYGEN SATURATION: 97 % | BODY MASS INDEX: 36.12 KG/M2 | HEART RATE: 79 BPM | HEIGHT: 68 IN | SYSTOLIC BLOOD PRESSURE: 90 MMHG | TEMPERATURE: 98 F

## 2020-01-03 DIAGNOSIS — Z01.818 PRE-OPERATIVE CLEARANCE: ICD-10-CM

## 2020-01-03 DIAGNOSIS — M54.16 LUMBAR RADICULOPATHY, CHRONIC: ICD-10-CM

## 2020-01-03 DIAGNOSIS — K21.9 GASTROESOPHAGEAL REFLUX DISEASE, ESOPHAGITIS PRESENCE NOT SPECIFIED: ICD-10-CM

## 2020-01-03 DIAGNOSIS — M47.26 OSTEOARTHRITIS OF SPINE WITH RADICULOPATHY, LUMBAR REGION: Primary | ICD-10-CM

## 2020-01-03 DIAGNOSIS — M47.26 OSTEOARTHRITIS OF SPINE WITH RADICULOPATHY, LUMBAR REGION: ICD-10-CM

## 2020-01-03 DIAGNOSIS — I10 ESSENTIAL HYPERTENSION: ICD-10-CM

## 2020-01-03 PROCEDURE — 99214 PR OFFICE/OUTPT VISIT, EST, LEVL IV, 30-39 MIN: ICD-10-PCS | Mod: S$GLB,,, | Performed by: FAMILY MEDICINE

## 2020-01-03 PROCEDURE — 71046 XR CHEST PA AND LATERAL: ICD-10-PCS | Mod: 26,,, | Performed by: RADIOLOGY

## 2020-01-03 PROCEDURE — 99999 PR PBB SHADOW E&M-EST. PATIENT-LVL III: ICD-10-PCS | Mod: PBBFAC,,, | Performed by: FAMILY MEDICINE

## 2020-01-03 PROCEDURE — 71046 X-RAY EXAM CHEST 2 VIEWS: CPT | Mod: TC

## 2020-01-03 PROCEDURE — 93010 EKG 12-LEAD: ICD-10-PCS | Mod: S$GLB,,, | Performed by: INTERNAL MEDICINE

## 2020-01-03 PROCEDURE — 99999 PR PBB SHADOW E&M-EST. PATIENT-LVL III: CPT | Mod: PBBFAC,,, | Performed by: FAMILY MEDICINE

## 2020-01-03 PROCEDURE — 93005 EKG 12-LEAD: ICD-10-PCS | Mod: S$GLB,,, | Performed by: FAMILY MEDICINE

## 2020-01-03 PROCEDURE — 71046 X-RAY EXAM CHEST 2 VIEWS: CPT | Mod: 26,,, | Performed by: RADIOLOGY

## 2020-01-03 PROCEDURE — 87081 CULTURE SCREEN ONLY: CPT

## 2020-01-03 PROCEDURE — 93005 ELECTROCARDIOGRAM TRACING: CPT | Mod: S$GLB,,, | Performed by: FAMILY MEDICINE

## 2020-01-03 PROCEDURE — 93010 ELECTROCARDIOGRAM REPORT: CPT | Mod: S$GLB,,, | Performed by: INTERNAL MEDICINE

## 2020-01-03 PROCEDURE — 99214 OFFICE O/P EST MOD 30 MIN: CPT | Mod: S$GLB,,, | Performed by: FAMILY MEDICINE

## 2020-01-03 RX ORDER — TELMISARTAN 80 MG/1
80 TABLET ORAL DAILY
Qty: 90 TABLET | Refills: 3 | Status: SHIPPED | OUTPATIENT
Start: 2020-01-03 | End: 2021-02-05 | Stop reason: SDUPTHER

## 2020-01-03 NOTE — PROGRESS NOTES
"Subjective:       Patient ID: Guillaume Conde III is a 49 y.o. male.    Chief Complaint: Pre-op Exam    49-year-old  male patient with Patient Active Problem List:     GERD (gastroesophageal reflux disease)     Essential hypertension     Obesity (BMI 30.0-34.9)     Osteoarthritis of spine with radiculopathy, lumbar region     Lumbar radiculopathy, chronic  Here for preop clearance for lumbar spine surgery by Dr. Gordy Le on  01/20/2020.  Patient has tried steroid injections as well as medications and physical therapy with no relief, continues to have ongoing pain up to 6 to 7/10, radiating to bilateral lower extremities after standing for 20-30 minutes or walking for prolonged periods of time, has been getting worse lately.   Denies any chest pain or difficulty breathing, denies any dizziness but has been feeling fatigued  Reports drinking adequate fluids    Review of Systems   Constitutional: Negative for appetite change, chills, fatigue and fever.   Eyes: Negative for visual disturbance.   Respiratory: Negative for cough and shortness of breath.    Cardiovascular: Negative for chest pain, palpitations and leg swelling.   Gastrointestinal: Negative for abdominal pain, nausea and vomiting.   Musculoskeletal: Positive for back pain and myalgias.   Skin: Negative for rash.   Neurological: Positive for numbness. Negative for weakness and headaches.   Psychiatric/Behavioral: Negative for sleep disturbance.         BP 90/80 (BP Location: Right arm, Patient Position: Sitting, BP Method: Large (Manual))   Pulse 79   Temp 97.9 °F (36.6 °C) (Oral)   Ht 5' 8" (1.727 m)   Wt 108.1 kg (238 lb 5.1 oz)   SpO2 97%   BMI 36.24 kg/m²   Objective:      Physical Exam   Constitutional: He is oriented to person, place, and time. He appears well-developed and well-nourished.   HENT:   Head: Normocephalic and atraumatic.   Mouth/Throat: Oropharynx is clear and moist.   Cardiovascular: Normal rate, regular " rhythm and normal heart sounds.   No murmur heard.  Pulmonary/Chest: Effort normal and breath sounds normal. He has no wheezes.   Abdominal: Soft. Bowel sounds are normal. There is no tenderness.   Musculoskeletal: He exhibits tenderness. He exhibits no edema.   Positive for paraspinal lumbar muscle tenderness bilaterally   Neurological: He is alert and oriented to person, place, and time.   Skin: Skin is warm and dry. No rash noted.   Psychiatric: He has a normal mood and affect.       Lab Visit on 01/03/2020   Component Date Value Ref Range Status    Specimen UA 01/03/2020 Urine, Clean Catch   Final    Color, UA 01/03/2020 Yellow  Yellow, Straw, Alexa Final    Appearance, UA 01/03/2020 Clear  Clear Final    pH, UA 01/03/2020 8.0  5.0 - 8.0 Final    Specific Gravity, UA 01/03/2020 1.015  1.005 - 1.030 Final    Protein, UA 01/03/2020 Negative  Negative Final    Comment: Recommend a 24 hour urine protein or a urine   protein/creatinine ratio if globulin induced proteinuria is  clinically suspected.      Glucose, UA 01/03/2020 Negative  Negative Final    Ketones, UA 01/03/2020 Negative  Negative Final    Bilirubin (UA) 01/03/2020 Negative  Negative Final    Occult Blood UA 01/03/2020 Negative  Negative Final    Nitrite, UA 01/03/2020 Negative  Negative Final    Leukocytes, UA 01/03/2020 Negative  Negative Final   Lab Visit on 01/03/2020   Component Date Value Ref Range Status    WBC 01/03/2020 3.78* 3.90 - 12.70 K/uL Final    RBC 01/03/2020 4.30* 4.60 - 6.20 M/uL Final    Hemoglobin 01/03/2020 12.6* 14.0 - 18.0 g/dL Final    Hematocrit 01/03/2020 41.2  40.0 - 54.0 % Final    Mean Corpuscular Volume 01/03/2020 96  82 - 98 fL Final    Mean Corpuscular Hemoglobin 01/03/2020 29.3  27.0 - 31.0 pg Final    Mean Corpuscular Hemoglobin Conc 01/03/2020 30.6* 32.0 - 36.0 g/dL Final    RDW 01/03/2020 15.6* 11.5 - 14.5 % Final    Platelets 01/03/2020 243  150 - 350 K/uL Final    MPV 01/03/2020 11.2  9.2 -  12.9 fL Final    Immature Granulocytes 01/03/2020 0.3  0.0 - 0.5 % Final    Gran # (ANC) 01/03/2020 1.5* 1.8 - 7.7 K/uL Final    Immature Grans (Abs) 01/03/2020 0.01  0.00 - 0.04 K/uL Final    Comment: Mild elevation in immature granulocytes is non specific and   can be seen in a variety of conditions including stress response,   acute inflammation, trauma and pregnancy. Correlation with other   laboratory and clinical findings is essential.      Lymph # 01/03/2020 1.6  1.0 - 4.8 K/uL Final    Mono # 01/03/2020 0.6  0.3 - 1.0 K/uL Final    Eos # 01/03/2020 0.1  0.0 - 0.5 K/uL Final    Baso # 01/03/2020 0.02  0.00 - 0.20 K/uL Final    nRBC 01/03/2020 0  0 /100 WBC Final    Gran% 01/03/2020 39.6  38.0 - 73.0 % Final    Lymph% 01/03/2020 42.1  18.0 - 48.0 % Final    Mono% 01/03/2020 15.9* 4.0 - 15.0 % Final    Eosinophil% 01/03/2020 1.6  0.0 - 8.0 % Final    Basophil% 01/03/2020 0.5  0.0 - 1.9 % Final    Differential Method 01/03/2020 Automated   Final    Sodium 01/03/2020 143  136 - 145 mmol/L Final    Potassium 01/03/2020 4.4  3.5 - 5.1 mmol/L Final    Chloride 01/03/2020 104  95 - 110 mmol/L Final    CO2 01/03/2020 29  23 - 29 mmol/L Final    Glucose 01/03/2020 77  70 - 110 mg/dL Final    BUN, Bld 01/03/2020 10  6 - 20 mg/dL Final    Creatinine 01/03/2020 1.1  0.5 - 1.4 mg/dL Final    Calcium 01/03/2020 9.7  8.7 - 10.5 mg/dL Final    Total Protein 01/03/2020 7.5  6.0 - 8.4 g/dL Final    Albumin 01/03/2020 3.9  3.5 - 5.2 g/dL Final    Total Bilirubin 01/03/2020 0.5  0.1 - 1.0 mg/dL Final    Comment: For infants and newborns, interpretation of results should be based  on gestational age, weight and in agreement with clinical  observations.  Premature Infant recommended reference ranges:  Up to 24 hours.............<8.0 mg/dL  Up to 48 hours............<12.0 mg/dL  3-5 days..................<15.0 mg/dL  6-29 days.................<15.0 mg/dL      Alkaline Phosphatase 01/03/2020 71  55 - 135 U/L  Final    AST 01/03/2020 19  10 - 40 U/L Final    ALT 01/03/2020 15  10 - 44 U/L Final    Anion Gap 01/03/2020 10  8 - 16 mmol/L Final    eGFR if African American 01/03/2020 >60.0  >60 mL/min/1.73 m^2 Final    eGFR if non African American 01/03/2020 >60.0  >60 mL/min/1.73 m^2 Final    Comment: Calculation used to obtain the estimated glomerular filtration  rate (eGFR) is the CKD-EPI equation.       Prothrombin Time 01/03/2020 10.3  9.0 - 12.5 sec Final    INR 01/03/2020 0.9  0.8 - 1.2 Final    Comment: Coumadin Therapy:  2.0 - 3.0 for INR for all indicators except mechanical heart valves  and antiphospholipid syndromes which should use 2.5 - 3.5.      aPTT 01/03/2020 28.5  21.0 - 32.0 sec Final    aPTT therapeutic range = 39-69 seconds   Office Visit on 01/03/2020   Component Date Value Ref Range Status    MRSA Surveillance Screen 01/03/2020 No MRSA isolated   Final     X-Ray Chest PA And Lateral  Narrative: EXAMINATION:  XR CHEST PA AND LATERAL    CLINICAL HISTORY:  Other spondylosis with radiculopathy, lumbar region    TECHNIQUE:  PA and lateral views of the chest were performed.    COMPARISON:  09/12/2018    FINDINGS:  The cardiac and mediastinal silhouettes appear within normal limits.   The lungs are clear bilaterally.  No acute osseous findings demonstrated.  Impression: No acute findings.    Electronically signed by: Romel Bautista MD  Date:    01/03/2020  Time:    12:34    Results for orders placed or performed in visit on 01/03/20   EKG 12-lead    Collection Time: 01/03/20  1:07 PM    Narrative    Test Reason : Z01.818    Vent. Rate : 061 BPM     Atrial Rate : 061 BPM     P-R Int : 180 ms          QRS Dur : 094 ms      QT Int : 384 ms       P-R-T Axes : 055 062 062 degrees     QTc Int : 386 ms    Normal sinus rhythm  Early repolarization  Normal ECG  When compared with ECG of 12-SEP-2018 11:04,  No significant change was found  Confirmed by GABRIELLE GIRON MD ([id number]) on 1/3/2020 7:08:20 PM    Referred  By: YOLIE BARRIENTOS           Confirmed By:GABRIELLE GIRON MD     Assessment/Plan:   1. Osteoarthritis of spine with radiculopathy, lumbar region  - CBC auto differential; Future  - Comprehensive metabolic panel; Future  - Urinalysis; Future  - X-Ray Chest PA And Lateral; Future  - EKG 12-lead; Future  - Protime-INR; Future  - APTT; Future  - Culture, MRSA  2. Lumbar radiculopathy, chronic  - CBC auto differential; Future  - Comprehensive metabolic panel; Future  - Urinalysis; Future  - X-Ray Chest PA And Lateral; Future  - EKG 12-lead; Future  - Protime-INR; Future  - APTT; Future  - Culture, MRSA  3. Pre-operative clearance  - CBC auto differential; Future  - Comprehensive metabolic panel; Future  - Urinalysis; Future  - X-Ray Chest PA And Lateral; Future  - EKG 12-lead; Future  - Protime-INR; Future  - APTT; Future  - Culture, MRSA  Clinical exam stable today  Vital signs stable except low blood pressure  Will check preop labs and will send preop clearance after reviewing test results      Reviewed labs showing mild anemia.   Chest x-ray and EKG shows no acute findings  Patient is at low risk for the proposed surgery  Preop clearance will be sent to  requested surgeon at [phone]    4. Essential hypertension  - telmisartan (MICARDIS) 80 MG Tab; Take 1 tablet (80 mg total) by mouth once daily.  Dispense: 90 tablet; Refill: 3  - Hypertension Digital Medicine (HDMP) Enrollment Order  - Hypertension Digital Medicine (HDMP): Assign Onboarding Questionnaires  Noted low blood pressure, will discontinue Micardis hydrochlorothiazide 80/12.5 and will start on Micardis 80 mg daily  Encouraged to monitor blood pressure trends      5. Gastroesophageal reflux disease, esophagitis presence not specified  Stable on pantoprazole 40 mg daily

## 2020-01-05 LAB — MRSA SPEC QL CULT: NORMAL

## 2020-01-06 ENCOUNTER — TELEPHONE (OUTPATIENT)
Dept: INTERNAL MEDICINE | Facility: CLINIC | Age: 50
End: 2020-01-06

## 2020-01-06 NOTE — TELEPHONE ENCOUNTER
----- Message from Lakisha Lyle sent at 1/6/2020  3:29 PM CST -----  Contact: Dr. Stokes Nurse (No[facility])  Caller states that she can't read any of the fax that was sent over it needs to resend over also send over the chest XRAY report and the EKG. Fax number is [phone] and give No[employer] a call at [phone]

## 2020-01-30 ENCOUNTER — EXTERNAL HOME HEALTH (OUTPATIENT)
Dept: HOME HEALTH SERVICES | Facility: HOSPITAL | Age: 50
End: 2020-01-30
Payer: COMMERCIAL

## 2020-01-30 ENCOUNTER — TELEPHONE (OUTPATIENT)
Dept: INTERNAL MEDICINE | Facility: CLINIC | Age: 50
End: 2020-01-30

## 2020-01-30 NOTE — TELEPHONE ENCOUNTER
----- Message from Izzy Alves sent at 1/30/2020  9:18 AM CST -----  Contact: pt   He's calling in regards to prior authorization       ,pls call pt back at [phone] (home)

## 2020-01-30 NOTE — TELEPHONE ENCOUNTER
S/w pt and informed that his medication will be refilled on tomorrow per insurance and that the pharmacy will contact him to let him know when it is ready. Pt voiced understanding./Antonio

## 2020-09-25 ENCOUNTER — PATIENT MESSAGE (OUTPATIENT)
Dept: OTHER | Facility: OTHER | Age: 50
End: 2020-09-25

## 2021-01-18 ENCOUNTER — NURSE TRIAGE (OUTPATIENT)
Dept: ADMINISTRATIVE | Facility: CLINIC | Age: 51
End: 2021-01-18

## 2021-01-18 ENCOUNTER — PATIENT MESSAGE (OUTPATIENT)
Dept: INTERNAL MEDICINE | Facility: CLINIC | Age: 51
End: 2021-01-18

## 2021-02-04 ENCOUNTER — PATIENT MESSAGE (OUTPATIENT)
Dept: INTERNAL MEDICINE | Facility: CLINIC | Age: 51
End: 2021-02-04

## 2021-02-04 DIAGNOSIS — I10 ESSENTIAL HYPERTENSION: ICD-10-CM

## 2021-02-04 RX ORDER — TELMISARTAN 80 MG/1
80 TABLET ORAL DAILY
Qty: 90 TABLET | Refills: 3 | OUTPATIENT
Start: 2021-02-04 | End: 2022-02-04

## 2021-02-05 ENCOUNTER — OFFICE VISIT (OUTPATIENT)
Dept: INTERNAL MEDICINE | Facility: CLINIC | Age: 51
End: 2021-02-05
Payer: COMMERCIAL

## 2021-02-05 ENCOUNTER — LAB VISIT (OUTPATIENT)
Dept: LAB | Facility: HOSPITAL | Age: 51
End: 2021-02-05
Attending: PHYSICIAN ASSISTANT
Payer: COMMERCIAL

## 2021-02-05 VITALS
BODY MASS INDEX: 34.68 KG/M2 | SYSTOLIC BLOOD PRESSURE: 100 MMHG | WEIGHT: 228.81 LBS | DIASTOLIC BLOOD PRESSURE: 70 MMHG | TEMPERATURE: 97 F | HEART RATE: 100 BPM | OXYGEN SATURATION: 97 % | HEIGHT: 68 IN

## 2021-02-05 DIAGNOSIS — I10 ESSENTIAL HYPERTENSION: ICD-10-CM

## 2021-02-05 DIAGNOSIS — K21.9 GASTROESOPHAGEAL REFLUX DISEASE, UNSPECIFIED WHETHER ESOPHAGITIS PRESENT: ICD-10-CM

## 2021-02-05 DIAGNOSIS — I10 ESSENTIAL HYPERTENSION: Primary | ICD-10-CM

## 2021-02-05 DIAGNOSIS — Z12.5 SCREENING FOR PROSTATE CANCER: ICD-10-CM

## 2021-02-05 LAB
ALBUMIN SERPL BCP-MCNC: 4 G/DL (ref 3.5–5.2)
ALP SERPL-CCNC: 67 U/L (ref 55–135)
ALT SERPL W/O P-5'-P-CCNC: 21 U/L (ref 10–44)
ANION GAP SERPL CALC-SCNC: 10 MMOL/L (ref 8–16)
AST SERPL-CCNC: 17 U/L (ref 10–40)
BASOPHILS # BLD AUTO: 0.01 K/UL (ref 0–0.2)
BASOPHILS NFR BLD: 0.2 % (ref 0–1.9)
BILIRUB SERPL-MCNC: 0.8 MG/DL (ref 0.1–1)
BUN SERPL-MCNC: 14 MG/DL (ref 6–20)
CALCIUM SERPL-MCNC: 9.8 MG/DL (ref 8.7–10.5)
CHLORIDE SERPL-SCNC: 99 MMOL/L (ref 95–110)
CHOLEST SERPL-MCNC: 223 MG/DL (ref 120–199)
CHOLEST/HDLC SERPL: 4.3 {RATIO} (ref 2–5)
CO2 SERPL-SCNC: 29 MMOL/L (ref 23–29)
CREAT SERPL-MCNC: 1.2 MG/DL (ref 0.5–1.4)
DIFFERENTIAL METHOD: ABNORMAL
EOSINOPHIL # BLD AUTO: 0.1 K/UL (ref 0–0.5)
EOSINOPHIL NFR BLD: 1.3 % (ref 0–8)
ERYTHROCYTE [DISTWIDTH] IN BLOOD BY AUTOMATED COUNT: 14.4 % (ref 11.5–14.5)
EST. GFR  (AFRICAN AMERICAN): >60 ML/MIN/1.73 M^2
EST. GFR  (NON AFRICAN AMERICAN): >60 ML/MIN/1.73 M^2
GLUCOSE SERPL-MCNC: 98 MG/DL (ref 70–110)
HCT VFR BLD AUTO: 39.6 % (ref 40–54)
HDLC SERPL-MCNC: 52 MG/DL (ref 40–75)
HDLC SERPL: 23.3 % (ref 20–50)
HGB BLD-MCNC: 12.5 G/DL (ref 14–18)
IMM GRANULOCYTES # BLD AUTO: 0.01 K/UL (ref 0–0.04)
IMM GRANULOCYTES NFR BLD AUTO: 0.2 % (ref 0–0.5)
LDLC SERPL CALC-MCNC: 145 MG/DL (ref 63–159)
LYMPHOCYTES # BLD AUTO: 1.9 K/UL (ref 1–4.8)
LYMPHOCYTES NFR BLD: 36.3 % (ref 18–48)
MCH RBC QN AUTO: 28.9 PG (ref 27–31)
MCHC RBC AUTO-ENTMCNC: 31.6 G/DL (ref 32–36)
MCV RBC AUTO: 92 FL (ref 82–98)
MONOCYTES # BLD AUTO: 0.7 K/UL (ref 0.3–1)
MONOCYTES NFR BLD: 12.6 % (ref 4–15)
NEUTROPHILS # BLD AUTO: 2.6 K/UL (ref 1.8–7.7)
NEUTROPHILS NFR BLD: 49.4 % (ref 38–73)
NONHDLC SERPL-MCNC: 171 MG/DL
NRBC BLD-RTO: 0 /100 WBC
PLATELET # BLD AUTO: 190 K/UL (ref 150–350)
PMV BLD AUTO: 10.8 FL (ref 9.2–12.9)
POTASSIUM SERPL-SCNC: 3.7 MMOL/L (ref 3.5–5.1)
PROT SERPL-MCNC: 7.8 G/DL (ref 6–8.4)
RBC # BLD AUTO: 4.33 M/UL (ref 4.6–6.2)
SODIUM SERPL-SCNC: 138 MMOL/L (ref 136–145)
TRIGL SERPL-MCNC: 130 MG/DL (ref 30–150)
WBC # BLD AUTO: 5.23 K/UL (ref 3.9–12.7)

## 2021-02-05 PROCEDURE — 99214 OFFICE O/P EST MOD 30 MIN: CPT | Mod: S$GLB,,, | Performed by: PHYSICIAN ASSISTANT

## 2021-02-05 PROCEDURE — 85025 COMPLETE CBC W/AUTO DIFF WBC: CPT

## 2021-02-05 PROCEDURE — 80061 LIPID PANEL: CPT

## 2021-02-05 PROCEDURE — 99214 PR OFFICE/OUTPT VISIT, EST, LEVL IV, 30-39 MIN: ICD-10-PCS | Mod: S$GLB,,, | Performed by: PHYSICIAN ASSISTANT

## 2021-02-05 PROCEDURE — 36415 COLL VENOUS BLD VENIPUNCTURE: CPT

## 2021-02-05 PROCEDURE — 99999 PR PBB SHADOW E&M-EST. PATIENT-LVL III: ICD-10-PCS | Mod: PBBFAC,,, | Performed by: PHYSICIAN ASSISTANT

## 2021-02-05 PROCEDURE — 99999 PR PBB SHADOW E&M-EST. PATIENT-LVL III: CPT | Mod: PBBFAC,,, | Performed by: PHYSICIAN ASSISTANT

## 2021-02-05 PROCEDURE — 84153 ASSAY OF PSA TOTAL: CPT

## 2021-02-05 PROCEDURE — 80053 COMPREHEN METABOLIC PANEL: CPT

## 2021-02-05 RX ORDER — PANTOPRAZOLE SODIUM 40 MG/1
40 TABLET, DELAYED RELEASE ORAL DAILY
Qty: 90 TABLET | Refills: 3 | Status: SHIPPED | OUTPATIENT
Start: 2021-02-05 | End: 2022-02-08 | Stop reason: SDUPTHER

## 2021-02-05 RX ORDER — PANTOPRAZOLE SODIUM 40 MG/1
40 TABLET, DELAYED RELEASE ORAL DAILY
Qty: 90 TABLET | Refills: 3 | Status: SHIPPED | OUTPATIENT
Start: 2021-02-05 | End: 2021-02-05 | Stop reason: SDUPTHER

## 2021-02-05 RX ORDER — TELMISARTAN AND HYDROCHLORTHIAZIDE 80; 12.5 MG/1; MG/1
TABLET ORAL
COMMUNITY
Start: 2020-11-23 | End: 2021-02-05 | Stop reason: SDUPTHER

## 2021-02-05 RX ORDER — TAMSULOSIN HYDROCHLORIDE 0.4 MG/1
CAPSULE ORAL
COMMUNITY
Start: 2021-02-05

## 2021-02-05 RX ORDER — TELMISARTAN 80 MG/1
80 TABLET ORAL DAILY
Qty: 90 TABLET | Refills: 3 | Status: SHIPPED | OUTPATIENT
Start: 2021-02-05 | End: 2021-02-05 | Stop reason: SDUPTHER

## 2021-02-05 RX ORDER — TELMISARTAN 80 MG/1
80 TABLET ORAL DAILY
Qty: 90 TABLET | Refills: 3 | Status: SHIPPED | OUTPATIENT
Start: 2021-02-05 | End: 2022-04-20

## 2021-02-06 LAB — COMPLEXED PSA SERPL-MCNC: 0.74 NG/ML (ref 0–4)

## 2021-03-19 ENCOUNTER — TELEPHONE (OUTPATIENT)
Dept: PHARMACY | Facility: CLINIC | Age: 51
End: 2021-03-19

## 2022-01-26 ENCOUNTER — OFFICE VISIT (OUTPATIENT)
Dept: INTERNAL MEDICINE | Facility: CLINIC | Age: 52
End: 2022-01-26
Payer: COMMERCIAL

## 2022-01-26 VITALS
DIASTOLIC BLOOD PRESSURE: 88 MMHG | BODY MASS INDEX: 35.05 KG/M2 | SYSTOLIC BLOOD PRESSURE: 132 MMHG | OXYGEN SATURATION: 97 % | WEIGHT: 231.25 LBS | HEART RATE: 88 BPM | HEIGHT: 68 IN | TEMPERATURE: 97 F

## 2022-01-26 DIAGNOSIS — I10 ESSENTIAL HYPERTENSION: ICD-10-CM

## 2022-01-26 DIAGNOSIS — K21.9 GASTROESOPHAGEAL REFLUX DISEASE, UNSPECIFIED WHETHER ESOPHAGITIS PRESENT: Primary | ICD-10-CM

## 2022-01-26 PROBLEM — E66.01 CLASS 2 SEVERE OBESITY DUE TO EXCESS CALORIES WITH SERIOUS COMORBIDITY AND BODY MASS INDEX (BMI) OF 35.0 TO 35.9 IN ADULT: Status: ACTIVE | Noted: 2019-10-24

## 2022-01-26 PROCEDURE — 99999 PR PBB SHADOW E&M-EST. PATIENT-LVL III: CPT | Mod: PBBFAC,,, | Performed by: PHYSICIAN ASSISTANT

## 2022-01-26 PROCEDURE — 99999 PR PBB SHADOW E&M-EST. PATIENT-LVL III: ICD-10-PCS | Mod: PBBFAC,,, | Performed by: PHYSICIAN ASSISTANT

## 2022-01-26 PROCEDURE — 99213 PR OFFICE/OUTPT VISIT, EST, LEVL III, 20-29 MIN: ICD-10-PCS | Mod: S$GLB,,, | Performed by: PHYSICIAN ASSISTANT

## 2022-01-26 PROCEDURE — 99213 OFFICE O/P EST LOW 20 MIN: CPT | Mod: S$GLB,,, | Performed by: PHYSICIAN ASSISTANT

## 2022-01-26 NOTE — PROGRESS NOTES
Subjective:      Patient ID: Guillaume Conde III is a 51 y.o. male.    Chief Complaint: medical release form    HPI   Mr. Conde is here today to have paperwork filled out. 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 Authorization to disclose protected health information for [employer].   The only new medical problem is that he had  lumbar fusion 1/2020 and symptoms. Surgery very successful. COmpletely alleviated his back pain, sciatica, and bladder issues. Dr. Le and Dr. Tip Meredith.   Otherwise doing well. GERD STABLE ON PROTONIX. BP stable and controlled on telmisartan.     Mood has been great. No depression/anxiety.   NO chest pain,palpitations, shortness of breath, weakness, lightheadedness/dizziness.       Patient Active Problem List   Diagnosis    GERD (gastroesophageal reflux disease)    Essential hypertension    Class 2 severe obesity due to excess calories with serious comorbidity and body mass index (BMI) of 35.0 to 35.9 in adult    Osteoarthritis of spine with radiculopathy, lumbar region    Lumbar radiculopathy, chronic       Current Outpatient Medications:     pantoprazole (PROTONIX) 40 MG tablet, Take 1 tablet (40 mg total) by mouth once daily., Disp: 90 tablet, Rfl: 3    tamsulosin (FLOMAX) 0.4 mg Cap, , Disp: , Rfl:     telmisartan (MICARDIS) 80 MG Tab, Take 1 tablet (80 mg total) by mouth once daily., Disp: 90 tablet, Rfl: 3    Review of Systems   Constitutional: Negative for activity change and unexpected weight change.   HENT: Negative for hearing loss, rhinorrhea and trouble swallowing.    Eyes: Negative for discharge and visual disturbance.   Respiratory: Negative for chest tightness and wheezing.    Cardiovascular: Negative for chest pain and palpitations.   Gastrointestinal: Negative for blood in stool, constipation, diarrhea and vomiting.   Endocrine: Negative for polydipsia and polyuria.   Genitourinary: Negative for difficulty urinating, hematuria and urgency.  "  Musculoskeletal: Negative for arthralgias, joint swelling and neck pain.   Neurological: Negative for weakness and headaches.   Psychiatric/Behavioral: Negative for confusion and dysphoric mood.     Objective:   /88 (BP Location: Right arm, Patient Position: Sitting, BP Method: Large (Manual))   Pulse 88   Temp 96.7 °F (35.9 °C) (Tympanic)   Ht 5' 8" (1.727 m)   Wt 104.9 kg (231 lb 4.2 oz)   SpO2 97%   BMI 35.16 kg/m²     Physical Exam  Vitals and nursing note reviewed.   Constitutional:       General: He is active. He is not in acute distress.Vital signs are normal.      Appearance: Normal appearance. He is well-developed and well-nourished. He is not ill-appearing, toxic-appearing or diaphoretic.   HENT:      Head: Normocephalic and atraumatic.   Cardiovascular:      Rate and Rhythm: Normal rate and regular rhythm.      Heart sounds: Normal heart sounds. No murmur heard.  No friction rub. No gallop.    Pulmonary:      Effort: Pulmonary effort is normal. No respiratory distress.      Breath sounds: Normal breath sounds. No wheezing or rales.   Skin:     General: Skin is warm.      Coloration: Skin is not pale.      Findings: No erythema or rash.   Neurological:      Mental Status: He is alert and oriented to person, place, and time.   Psychiatric:         Mood and Affect: Mood and affect and mood normal.         Behavior: Behavior normal.         Thought Content: Thought content normal.         Judgment: Judgment normal.         Assessment:     1. Gastroesophageal reflux disease, unspecified whether esophagitis present    2. Essential hypertension      Plan:   Gastroesophageal reflux disease, unspecified whether esophagitis present    Essential hypertension    -medical problems stable and controlled. Papers appropriately filled out for patient today  -colonoscopy this year.   -follow up with pcp in about 6 months    Follow up in about 6 months (around 7/26/2022), or if symptoms worsen or fail to " improve.

## 2022-02-08 DIAGNOSIS — K21.9 GASTROESOPHAGEAL REFLUX DISEASE, UNSPECIFIED WHETHER ESOPHAGITIS PRESENT: ICD-10-CM

## 2022-02-08 RX ORDER — PANTOPRAZOLE SODIUM 40 MG/1
40 TABLET, DELAYED RELEASE ORAL DAILY
Qty: 90 TABLET | Refills: 3 | Status: SHIPPED | OUTPATIENT
Start: 2022-02-08 | End: 2022-05-25

## 2022-09-27 DIAGNOSIS — I10 ESSENTIAL HYPERTENSION: ICD-10-CM
